# Patient Record
Sex: FEMALE | URBAN - NONMETROPOLITAN AREA
[De-identification: names, ages, dates, MRNs, and addresses within clinical notes are randomized per-mention and may not be internally consistent; named-entity substitution may affect disease eponyms.]

---

## 2019-07-12 ENCOUNTER — APPOINTMENT (RX ONLY)
Dept: URBAN - NONMETROPOLITAN AREA CLINIC 1 | Facility: CLINIC | Age: 59
Setting detail: DERMATOLOGY
End: 2019-07-12

## 2019-07-12 DIAGNOSIS — Z85.828 PERSONAL HISTORY OF OTHER MALIGNANT NEOPLASM OF SKIN: ICD-10-CM

## 2019-07-12 DIAGNOSIS — Z12.83 ENCOUNTER FOR SCREENING FOR MALIGNANT NEOPLASM OF SKIN: ICD-10-CM

## 2019-07-12 PROBLEM — D48.5 NEOPLASM OF UNCERTAIN BEHAVIOR OF SKIN: Status: ACTIVE | Noted: 2019-07-12

## 2019-07-12 PROCEDURE — 99202 OFFICE O/P NEW SF 15 MIN: CPT | Mod: 25

## 2019-07-12 PROCEDURE — 11102 TANGNTL BX SKIN SINGLE LES: CPT

## 2019-07-12 PROCEDURE — ? VITAMIN B3 COUNSELING

## 2019-07-12 PROCEDURE — ? BIOPSY BY SHAVE METHOD

## 2019-07-12 PROCEDURE — ? COUNSELING

## 2019-07-12 ASSESSMENT — LOCATION DETAILED DESCRIPTION DERM
LOCATION DETAILED: RIGHT CENTRAL MALAR CHEEK
LOCATION DETAILED: RIGHT SUPERIOR MEDIAL UPPER BACK

## 2019-07-12 ASSESSMENT — LOCATION ZONE DERM
LOCATION ZONE: FACE
LOCATION ZONE: TRUNK

## 2019-07-12 ASSESSMENT — LOCATION SIMPLE DESCRIPTION DERM
LOCATION SIMPLE: RIGHT CHEEK
LOCATION SIMPLE: RIGHT UPPER BACK

## 2019-07-12 NOTE — PROCEDURE: BIOPSY BY SHAVE METHOD
Electrodesiccation And Curettage Text: The wound bed was treated with electrodesiccation and curettage after the biopsy was performed.
Was A Bandage Applied: Yes
Dressing: Band-Aid
Anesthesia Volume In Cc: 1
Type Of Destruction Used: Electrodesiccation and Curettage
Consent: Written consent was obtained and risks were reviewed including but not limited to scarring, infection, bleeding, scabbing, incomplete removal, nerve damage and allergy to anesthesia.
Render Path Notes In Note?: No
Silver Nitrate Text: The wound bed was treated with silver nitrate after the biopsy was performed.
Billing Type: Third-Party Bill
Hemostasis: Aluminum Chloride
Curettage Text: The wound bed was treated with curettage after the biopsy was done.
Detail Level: Simple
Biopsy Method: Dermablade
Anesthesia Type: 1% lidocaine with epinephrine and a 1:10 solution of 8.4% sodium bicarbonate
Post-Care Instructions: I reviewed with the patient in detail post-care instructions. Patient is to keep the biopsy site dry overnight, and then apply Polysporin twice daily until healed. Patient may apply hydrogen peroxide soaks to remove any crusting.
Depth Of Biopsy: dermis
Size Of Lesion In Cm: 0.7
Lab: [id number]
Electrodesiccation Text: The wound bed was treated with electrodesiccation after the biopsy was performed.
Biopsy Type: H and E
Lab Facility: [id number]
Cryotherapy Text: The wound bed was treated with cryotherapy after the biopsy was performed.
X Size Of Lesion In Cm: 0.4
Notification Instructions: Patient will be notified of biopsy results. However, patient instructed to call the office if not contacted within 2 weeks.
Wound Care: Vaseline
Additional Anesthesia Volume In Cc (Will Not Render If 0): 0

## 2020-01-03 ENCOUNTER — APPOINTMENT (RX ONLY)
Dept: URBAN - NONMETROPOLITAN AREA CLINIC 1 | Facility: CLINIC | Age: 60
Setting detail: DERMATOLOGY
End: 2020-01-03

## 2020-01-03 DIAGNOSIS — Z41.9 ENCOUNTER FOR PROCEDURE FOR PURPOSES OTHER THAN REMEDYING HEALTH STATE, UNSPECIFIED: ICD-10-CM

## 2020-01-03 PROCEDURE — ? MEDICAL CONSULTATION: PRODUCTS

## 2020-01-03 PROCEDURE — ? COSMETIC CONSULTATION - MICRO-NEEDLING

## 2020-01-03 PROCEDURE — ? COSMETIC CONSULTATION: CHEMICAL PEELS

## 2020-01-03 PROCEDURE — ? COSMETIC CONSULTATION: LASER RESURFACING

## 2020-01-24 ENCOUNTER — APPOINTMENT (RX ONLY)
Dept: URBAN - NONMETROPOLITAN AREA CLINIC 1 | Facility: CLINIC | Age: 60
Setting detail: DERMATOLOGY
End: 2020-01-24

## 2020-01-24 DIAGNOSIS — Z41.9 ENCOUNTER FOR PROCEDURE FOR PURPOSES OTHER THAN REMEDYING HEALTH STATE, UNSPECIFIED: ICD-10-CM

## 2020-01-24 PROCEDURE — ? ADDITIONAL NOTES

## 2020-01-24 PROCEDURE — ? CLEAR AND BRILLIANT LASER

## 2020-01-24 ASSESSMENT — LOCATION ZONE DERM
LOCATION ZONE: NECK
LOCATION ZONE: TRUNK
LOCATION ZONE: FACE

## 2020-01-24 ASSESSMENT — LOCATION SIMPLE DESCRIPTION DERM
LOCATION SIMPLE: INFERIOR FOREHEAD
LOCATION SIMPLE: CHEST
LOCATION SIMPLE: LEFT ANTERIOR NECK

## 2020-01-24 ASSESSMENT — LOCATION DETAILED DESCRIPTION DERM
LOCATION DETAILED: MIDDLE STERNUM
LOCATION DETAILED: LEFT INFERIOR ANTERIOR NECK
LOCATION DETAILED: INFERIOR MID FOREHEAD

## 2020-01-24 NOTE — PROCEDURE: CLEAR AND BRILLIANT LASER
Detail Level: Zone
Laser Type: Clear+Brilliant
Consent: Written consent reviewed by RN and signed by pt.  Risks reviewed including but not limited to redness, heat sensation, swelling, pigmentary changes, scabbing, crusting, blistering, and reactivation of cold sores. \\n\\nNo guarantees can be made and results vary from pt to pt.
Pre-Procedure Text: Pre treatment photos taken and topical numbing removed with a warm towel prior to procedure.\\n\\nAppropriate eye protection placed on pt and pt instructed to keep their eyes closed during the treatment.
Topical Anesthesia?: 23% lidocaine, 7% tetracaine
Length Of Topical Anesthesia Application (Optional): 20 minutes
Post-Procedure Care: 8 passes with C&B handpiece, additional pass over areas of concern and 2 passes, low level under eyes. Moisturizer and sun protection applied to treatment area.
Price (Use Numbers Only, No Special Characters Or $): 500
Energy Level: High
Post-Care Instructions: RN reviewed with the patient in detail post-care instructions.  Pt aware of post treatment pain, swelling, redness, and rough texture, which can last days.  Strict sun avoidance and protection emphasized.  If sun exposure is unavoidable, pt instructed to cover tx area with clothing. \\n\\Héctor pt concerns and questions addressed, pt verbalized understanding.

## 2020-01-24 NOTE — PROCEDURE: ADDITIONAL NOTES
Additional Notes: Clear and [place] Laser Patient Post Care Instructions\\n\\nThe Clear & [place] Treatment is a safe and effective laser resurfacing treatment for fine lines, wrinkles, and superficial pigment, such as melasma.  \\n\\Toñito with any cosmetic treatment, no guarantees can be made and results vary individually from patient to patient.  This information sheet outlines expectations pre and post treatment and provides a reference for your home routine after treatment.  \\n\\Yanet treatment done in the office can be improved or worsened by what you do at home both before and after.  Your provider requests you bring your home skincare products with you for an assessment; either during the consult or on the day of service.  If you do not currently use skin care, a post treatment kit with products safe to use post treatment can be purchased for a minimal cost.  \\n\\nThe most common cause of complications is failing to follow these recommendations.\\n\\nBefore your treatment\\n• Discontinue retinol, retinoid, vitamin A, glycolic acids, salicylic acids, or other exfoliating products, such as scrubs, 2 days before treatment. \\n• Avoid excessive sun exposure; sunburned skin cannot be treated. \\n• Please inform your provider if you experience cold sores.  \\n Laser treatments can stimulate cold sores but an oral, anti-viral medication can be prescribed for prevention of an outbreak.  \\n You may take the anti-viral 3 days before and 3 days post the treatment to suppress possible outbreaks. \\n• Bring your skincare products with you for assessment of safe application after treatment.  \\n\\nDuring your treatment\\n• The consent will be reviewed and any questions or concerns you have will be addressed prior to beginning the treatment. \\n• Photos for before and after comparison are taken. \\n• A topical numbing agent, usually Lidocaine and Tetracaine, is applied to the treatment area.  \\n The Clear & [place] treatment feels like prickly heat while performed with a warm sensation, similar to a sunburn, for a few minutes to a few hours post treatment.  \\n Once the treatment area has become numb, the topical anesthetic will be removed. \\n• Eye protection is not necessary for this treatment.  However, the provider may provide you with eye protection or request your eyes remain closed during the treatment. \\n• The treatment should be tolerable.  If you are experiencing significant discomfort, please inform the provider and adjustments can be made to ensure your comfort. \\n\\nAfter your treatment\\n• Immediately after the treatment, you will experience redness and a heat sensation similar to a sunburn.  \\n Most redness resolves during the first day after treatment, but a rolly “glow” can remain for several days. \\n You may apply makeup as soon as 3 hours post treatment. \\n You may use ice or cool compresses to calm the sensation. \\n• Swelling is possible post treatment but usually resolves within 24 hours.  \\n You may use ice packs or take oral anti-inflammatory medications to diminish these affects. \\n• Over the next few days, you will feel a dry, sandpaper-like texture to your skin.  However, the texture is usually not visible to others or appears as dry skin.  \\n DO NOT pick at the flaking, this can cause hyperpigmentation; apply moisturizer as frequently as necessary to calm this affect and it should resolve in a few days. \\n You may wear make-up if you desire and remember to protect from the sun especially while healing. \\n• Most patients will have an improved appearance in 3 to 5 days.  Treatments can be performed monthly. \\n• Sun protection and wind protection is absolutely necessary following your treatment and especially until the skin has fully healed.  \\n It is recommended you avoid the sun completely for the first 24 to 48 hours post treatment. \\n If sun exposure is unavoidable, it is important to be diligent in reapplication of a broad-spectrum sunscreen every 90 minutes. In addition, protecting the treatment area with a wide brimmed hat or clothing is highly recommended.  \\n Excessive sun exposure while the skin is still healing can cause hyperpigmentation or other complications. \\n\\nHome Care After Your Clear & [place] Treatment\\nInstructions for Home Care Until the Skin Has Fully Healed\\n• The first 24-48 hours post treatment AVOID:\\n Direct sun exposure; it is recommended to protect the treatment area with clothing and a wide brimmed hat in addition to topical application of broad-spectrum sunscreen. \\n Excessive heat exposure such at saunas and hot tubs. \\n Excessive, intense exercise that stimulates a lot of internal heat or perspiration.    \\n Smoking and excessive alcohol intake.\\n• Cleanse with a gentle .\\n Cleansing is important to prevent breakouts. \\n If you are acne prone an anti-microbial spray may be recommended to suppress an acne flare. \\n• Moisturize with a gentle moisturizer approved by your provider. \\n• AVOID corrective products such as: \\n Retinol/Retinoids\\n Alpha Hydroxy Acids (AHAs)\\n Beta Hydroxy Acids (BHAs)\\n Salicylic Acids\\n Glycolic Acids\\n Products labeled “Anti-aging,” “Skin Renewing,” or “Exfoliating,” are not recommended while you are healing. \\n If in doubt of a product’s safety, avoid using the product and stick to the approved products discussed with your provider. \\n• While some redness, flaking, crusting, and swelling is to be expected, if you feel you are having an abnormal reaction to the treatment you may contact the our office at [phone] at any time. \\n Some patients may experience a blistering of the skin after their treatment.  This is very rare but if you do experience a blister, DO NOT pick at the top of the blister. You may apply thick moisturizer such as Aquaphor or an anti-bacterial ointment to the blister and allow it to heal.  In most cases, the skin will return to normal once healed.\\n• PROTECT the treatment area from the sun with a broad-spectrum sunscreen\\n Remember to reapply every 90 minutes when you are exposed to the sun. \\nEspecially in the first 24-48 hours post treatment it is recommended to protect the treatment area with clothing or a wide-brimmed hat in addition to topical sunscreen application.  \\n• Once the skin is fully healed you may return to your normal skin care routine. \\nIf you have other questions or concerns, you may contact the office at anytime; [phone].  [facility] hopes you enjoy your experience and result!\\nOther Instructions:
Detail Level: Simple

## 2020-02-07 ENCOUNTER — APPOINTMENT (RX ONLY)
Dept: URBAN - NONMETROPOLITAN AREA CLINIC 1 | Facility: CLINIC | Age: 60
Setting detail: DERMATOLOGY
End: 2020-02-07

## 2020-02-07 DIAGNOSIS — Z41.9 ENCOUNTER FOR PROCEDURE FOR PURPOSES OTHER THAN REMEDYING HEALTH STATE, UNSPECIFIED: ICD-10-CM

## 2020-02-07 PROCEDURE — ? ADDITIONAL NOTES

## 2020-02-07 PROCEDURE — ? FRAXEL

## 2020-02-07 PROCEDURE — ? COSMETIC FOLLOW-UP

## 2020-02-07 NOTE — PROCEDURE: FRAXEL
Treatment Level: 6
Treatment Level: 1
Add Post-Care Below To The Note: No
Consent: Written consent reviewed by RN and signed by pt. Risks reviewed including but not limited to crusting, scabbing, swelling, erythema, heat,  pain, and incomplete improvement. \\n\\nPt aware that several treatments may be needed to achieve desired result.   \\n\\nNo guarantees can be made and results vary from patient to patient. \\n\\Héctor pt concerns and questions addressed, pt verbalized understanding.
Total Energy In Kj (Optional- Don't Include Units): 1.19
Price (Use Numbers Only, No Special Characters Or $): 200
Post-Care Instructions: RN reviewed with the pt in detail post-care instructions and provided instructions in writing to the pt. \\n\\nOutlined post treatment expectations and downtime.  Pt aware of post-treatment pain, swelling, and redness, which can last days.  Strict sun avoidance and protection emphasized.  If sun exposure is unavoidable, pt instructed to cover treatment area with clothing.  \\n\\nPt urged to purchase Lasercyn product to reduce risk of post treatment breakouts. \\n\\Héctor pt concerns and questions addressed, pt verbalized understanding.\\n\\nSpot tx of problem areas, perioral, fh, and under eyes.
Total Coverage: 17%
External Cooling Fan Speed: 5
Wavelength: 1550nm
Location: perioral area
Topical Anesthesia Type: 23% Lidocaine 7% Tetracaine
External Cooling: Viola Cryo 5
Length Of Topical Anesthesia Application (Optional): 15 minutes
Anesthesia Type: 1% lidocaine with epinephrine
Pre-Procedure Text (Will Appear After Anesthesia Text): Photos taken and topical numbing removed with a warm towel prior to procedure.\\n\\nAppropriate eye protection placed on pt and pt instructed to keep their eyes closed during the treatment.
Detail Level: Zone
Indication: resurfacing
Energy(Mj/Cm2): 60

## 2020-02-07 NOTE — PROCEDURE: ADDITIONAL NOTES
Additional Notes: Fraxel 1550 Laser Patient Care Instructions\\nThe Fraxel Laser Treatment is a safe and effective laser resurfacing treatment for deeper set lines and wrinkles, acne scarring, and some pigmented lesions.  Any cosmetic treatment can be improved or worsened by what you do at home both before and after.  \\n*The most common cause of complications is failing to follow these recommendations. *\\nBefore your treatment\\n• Discontinue topical retinol, retinoid, vitamin A, glycolic acids, salicylic acids, or other exfoliating products, such as scrubs, 2 days before treatment. \\n• Avoid excessive sun exposure; sunburned skin cannot be treated. \\n• Please inform your provider if you experience cold sores.  \\n     -Laser treatments can stimulate cold sores but an oral, anti-viral medication can be prescribed for prevention of an outbreak.  \\n     -You may take the anti-viral 3 days before and 3 days post the treatment to suppress possible outbreaks. \\n• If you are prone to acne or breakout please inform your provider. \\n     -An antimicrobial spray can be purchased for use after your treatment and greatly reduces the risk of post treatment breakouts. \\n• Bring your skincare products with you for assessment of safe application after treatment.\\n     -If you do not currently use skin care, a post treatment kit with products safe to use post treatment can be purchased for a minimal cost.  \\nDuring your treatment\\n• The consent will be reviewed and any questions or concerns you have will be addressed prior to beginning the treatment. \\n• Photos for before and after comparison are taken. \\n• A topical numbing agent, usually Lidocaine and Tetracaine, is applied to the treatment area.  \\n     -The Fraxel treatment feels like prickly heat while performed with a warm sensation, similar to a sunburn, for a few hours post treatment.  \\n     -Once the treatment area has become numb, the topical anesthetic and any makeup will be removed. \\n• Eye protection will be placed on your eyes.  Please keep your eyes closed during the treatment.   \\n• The treatment should be tolerable.  If you are experiencing significant discomfort, please inform the provider and adjustments can be made to ensure your comfort.\\n• Immediately after treatment a wound healing serum and sunscreen is applied to the treatment area.  \\nAfter your treatment\\n• Immediately after the treatment, you will experience heat sensation, redness, swelling and sometimes pinpoint bleeding. \\n     -Heat sensation can be intense and last for 2-3 hours after treatment. \\n     -Fanning, ice, and cool compresses can help reduce the hot sensation. \\no Swelling usually lasts two to three days.  The swelling can be significant and cause some discomfort. To minimize swelling do the following: \\n     -Apply cold compresses to the treatment area for 10 minutes of every hour to diminish heat sensation and swelling. \\n     -Sleep elevated the first night. Use as many pillows as you can tolerate.\\n     -You will notice most of the swelling on the first morning after treatment, particularly under the eyes. \\n• Over the next few days, redness may worsen. Some patients will also experience itching. \\no A topical 1% Hydrocortisone cream (over the counter) may help relieve redness and itching.  \\n• You may also notice that your skin appears bronzed or pigemented lesions look darker than before the treatment.\\no This is normal; the pigment will often flake off and show improvement over the next few days.\\n• Your skin may feel dry, peel, or flake 3-4 days after treatment. You may notice a “sandpaper” texture a few days after treatment.\\no This is the treated tissue working its way out of your body as new skin is regenerated. \\no DO NOT PICK at this flaking; allow it to slough off naturally. Applying moisturizer can help. \\n• Most patients complete the healing process 5- 7 days after a treatment on the face; off face treatment areas such as the neck or chest may take longer to heal. \\n• Once the initial sloughing is complete, you may notice some redness remains over the next few weeks.\\no Most redness resolves during the first week after treatment, but a rolly “glow” can remain for several weeks. If you wish, you can apply makeup to minimize the redness. \\nInstructions for Home Care Until the Skin Has Fully Healed\\n• The first 24-48 hours post treatment AVOID:\\no Direct sun exposure; it is recommended to protect the treatment area with clothing and a wide brimmed hat in addition to topical application of broad-spectrum sunscreen. \\no Excessive heat exposure such at saunas and hot tubs. \\no Excessive, intense exercise that stimulates a lot of internal heat or perspiration.    \\no Smoking and excessive alcohol intake.\\no Anything you would not do if you had a sunburn.\\n• Cleanse twice per day with a gentle .\\no Cleansing is important to prevent breakouts. \\no If you are acne prone, an anti-microbial spray may be used after cleansing to prevent breakout. \\n• Moisturize with a gentle moisturizer approved by your provider. \\n• AVOID topical corrective products such as: \\no Retinol/Retinoids\\no Alpha Hydroxy Acids (AHAs) or Beta Hydroxy Acids (BHAs)\\no Salicylic Acids or Glycolic Acids\\no Products labeled “Anti-aging,” “Skin Renewing,” or “Exfoliating,” are not recommended while you are healing. \\no If in doubt of a product’s safety, avoid using the product and stick to the approved products discussed with your provider. \\n• While some redness, flaking, crusting, and swelling is to be expected, if you feel you are having an abnormal reaction to the treatment you may contact the our office, [phone], at any time. \\no Some patients may experience a blistering of the skin after their treatment.  This is very rare but if you do experience a blister, DO NOT pick at the top of the blister. You may apply thick moisturizer such as Aquaphor or an anti-bacterial ointment to the blister and allow it to heal.  In most cases, the skin will return to normal once healed.\\no If you feel your reaction is abnormal, vinegar soaks are encouraged:\\n     -Vinegar Soak Recipe for Abnormal Healing:  \\n1. Dilute one teaspoon of white vinegar per cup of water \\n2. Leave mix in the refrigerator to cool \\n3. Soak gauze or a clean washcloth in the solution, then apply to the skin 2-3 times a day for 5 minutes followed by moisturizer. \\n• PROTECT the treatment area from the sun with a broad-spectrum sunscreen.\\no Remember to reapply every 90 minutes when you are exposed to the sun. \\no Especially in the first 24-48 hours post treatment it is recommended to protect the treatment area with clothing or a wide-brimmed hat in addition to topical sunscreen application.  \\n• Once the skin is fully healed you may return to your normal skin care routine. \\n• Your skin will continue to improve over the next 9 weeks
Detail Level: Simple

## 2020-02-07 NOTE — PROCEDURE: COSMETIC FOLLOW-UP
Global Improvement: Good
Treatment Override (Free Text): C&B
Comments (Free Text): After comparing before &after photos the pt and I agree she had a good improvement with her problem areas. I recommended we try Fraxel to spot tx the perioral and fh lines.
Detail Level: Zone
Price (Use Numbers Only, No Special Characters Or $): 0
Patient Satisfaction: Unsure

## 2020-03-13 ENCOUNTER — APPOINTMENT (RX ONLY)
Dept: URBAN - NONMETROPOLITAN AREA CLINIC 1 | Facility: CLINIC | Age: 60
Setting detail: DERMATOLOGY
End: 2020-03-13

## 2020-03-13 DIAGNOSIS — Z41.9 ENCOUNTER FOR PROCEDURE FOR PURPOSES OTHER THAN REMEDYING HEALTH STATE, UNSPECIFIED: ICD-10-CM

## 2020-03-13 PROCEDURE — ? FRAXEL

## 2020-03-13 PROCEDURE — ? ADDITIONAL NOTES

## 2020-03-13 NOTE — PROCEDURE: ADDITIONAL NOTES
Additional Notes: Fraxel 1550 Laser Patient Care Instructions\\nThe Fraxel Laser Treatment is a safe and effective laser resurfacing treatment for deeper set lines and wrinkles, acne scarring, and some pigmented lesions.  Any cosmetic treatment can be improved or worsened by what you do at home both before and after.  \\n*The most common cause of complications is failing to follow these recommendations. *\\nBefore your treatment\\n• Discontinue topical retinol, retinoid, vitamin A, glycolic acids, salicylic acids, or other exfoliating products, such as scrubs, 2 days before treatment. \\n• Avoid excessive sun exposure; sunburned skin cannot be treated. \\n• Please inform your provider if you experience cold sores.  \\n     -Laser treatments can stimulate cold sores but an oral, anti-viral medication can be prescribed for prevention of an outbreak.  \\n     -You may take the anti-viral 3 days before and 3 days post the treatment to suppress possible outbreaks. \\n• If you are prone to acne or breakout please inform your provider. \\n     -An antimicrobial spray can be purchased for use after your treatment and greatly reduces the risk of post treatment breakouts. \\n• Bring your skincare products with you for assessment of safe application after treatment.\\n     -If you do not currently use skin care, a post treatment kit with products safe to use post treatment can be purchased for a minimal cost.  \\nDuring your treatment\\n• The consent will be reviewed and any questions or concerns you have will be addressed prior to beginning the treatment. \\n• Photos for before and after comparison are taken. \\n• A topical numbing agent, usually Lidocaine and Tetracaine, is applied to the treatment area.  \\n     -The Fraxel treatment feels like prickly heat while performed with a warm sensation, similar to a sunburn, for a few hours post treatment.  \\n     -Once the treatment area has become numb, the topical anesthetic and any makeup will be removed. \\n• Eye protection will be placed on your eyes.  Please keep your eyes closed during the treatment.   \\n• The treatment should be tolerable.  If you are experiencing significant discomfort, please inform the provider and adjustments can be made to ensure your comfort.\\n• Immediately after treatment a wound healing serum and sunscreen is applied to the treatment area.  \\nAfter your treatment\\n• Immediately after the treatment, you will experience heat sensation, redness, swelling and sometimes pinpoint bleeding. \\n     -Heat sensation can be intense and last for 2-3 hours after treatment. \\n     -Fanning, ice, and cool compresses can help reduce the hot sensation. \\no Swelling usually lasts two to three days.  The swelling can be significant and cause some discomfort. To minimize swelling do the following: \\n     -Apply cold compresses to the treatment area for 10 minutes of every hour to diminish heat sensation and swelling. \\n     -Sleep elevated the first night. Use as many pillows as you can tolerate.\\n     -You will notice most of the swelling on the first morning after treatment, particularly under the eyes. \\n• Over the next few days, redness may worsen. Some patients will also experience itching. \\no A topical 1% Hydrocortisone cream (over the counter) may help relieve redness and itching.  \\n• You may also notice that your skin appears bronzed or pigemented lesions look darker than before the treatment.\\no This is normal; the pigment will often flake off and show improvement over the next few days.\\n• Your skin may feel dry, peel, or flake 3-4 days after treatment. You may notice a “sandpaper” texture a few days after treatment.\\no This is the treated tissue working its way out of your body as new skin is regenerated. \\no DO NOT PICK at this flaking; allow it to slough off naturally. Applying moisturizer can help. \\n• Most patients complete the healing process 5- 7 days after a treatment on the face; off face treatment areas such as the neck or chest may take longer to heal. \\n• Once the initial sloughing is complete, you may notice some redness remains over the next few weeks.\\no Most redness resolves during the first week after treatment, but a rolly “glow” can remain for several weeks. If you wish, you can apply makeup to minimize the redness. \\nInstructions for Home Care Until the Skin Has Fully Healed\\n• The first 24-48 hours post treatment AVOID:\\no Direct sun exposure; it is recommended to protect the treatment area with clothing and a wide brimmed hat in addition to topical application of broad-spectrum sunscreen. \\no Excessive heat exposure such at saunas and hot tubs. \\no Excessive, intense exercise that stimulates a lot of internal heat or perspiration.    \\no Smoking and excessive alcohol intake.\\no Anything you would not do if you had a sunburn.\\n• Cleanse twice per day with a gentle .\\no Cleansing is important to prevent breakouts. \\no If you are acne prone, an anti-microbial spray may be used after cleansing to prevent breakout. \\n• Moisturize with a gentle moisturizer approved by your provider. \\n• AVOID topical corrective products such as: \\no Retinol/Retinoids\\no Alpha Hydroxy Acids (AHAs) or Beta Hydroxy Acids (BHAs)\\no Salicylic Acids or Glycolic Acids\\no Products labeled “Anti-aging,” “Skin Renewing,” or “Exfoliating,” are not recommended while you are healing. \\no If in doubt of a product’s safety, avoid using the product and stick to the approved products discussed with your provider. \\n• While some redness, flaking, crusting, and swelling is to be expected, if you feel you are having an abnormal reaction to the treatment you may contact the our office, [phone], at any time. \\no Some patients may experience a blistering of the skin after their treatment.  This is very rare but if you do experience a blister, DO NOT pick at the top of the blister. You may apply thick moisturizer such as Aquaphor or an anti-bacterial ointment to the blister and allow it to heal.  In most cases, the skin will return to normal once healed.\\no If you feel your reaction is abnormal, vinegar soaks are encouraged:\\n     -Vinegar Soak Recipe for Abnormal Healing:  \\n1. Dilute one teaspoon of white vinegar per cup of water \\n2. Leave mix in the refrigerator to cool \\n3. Soak gauze or a clean washcloth in the solution, then apply to the skin 2-3 times a day for 5 minutes followed by moisturizer. \\n• PROTECT the treatment area from the sun with a broad-spectrum sunscreen.\\no Remember to reapply every 90 minutes when you are exposed to the sun. \\no Especially in the first 24-48 hours post treatment it is recommended to protect the treatment area with clothing or a wide-brimmed hat in addition to topical sunscreen application.  \\n• Once the skin is fully healed you may return to your normal skin care routine. \\n• Your skin will continue to improve over the next 9 weeks
Detail Level: Simple

## 2020-03-13 NOTE — PROCEDURE: FRAXEL
Energy(Mj/Cm2): 1
Number Of Passes: 6
Anesthesia Type: 1% lidocaine with epinephrine
Total Coverage: 17%
External Cooling: Viola Cryo 5
Location: perioral area
Post-Care Instructions: RN reviewed with the pt in detail post-care instructions and provided instructions in writing to the pt. \\n\\nOutlined post treatment expectations and downtime.  Pt aware of post-treatment pain, swelling, and redness, which can last days.  Strict sun avoidance and protection emphasized.  If sun exposure is unavoidable, pt instructed to cover treatment area with clothing.  \\n\\nPt urged to purchase Lasercyn product to reduce risk of post treatment breakouts. \\n\\Héctor pt concerns and questions addressed, pt verbalized understanding.\\n\\nSpot tx of problem areas, perioral, fh, and under eyes.
Indication: resurfacing
Consent: Written consent reviewed by RN and signed by pt. Risks reviewed including but not limited to crusting, scabbing, swelling, erythema, heat,  pain, and incomplete improvement. \\n\\nPt aware that several treatments may be needed to achieve desired result.   \\n\\nNo guarantees can be made and results vary from patient to patient. \\n\\Héctor pt concerns and questions addressed, pt verbalized understanding.
Pre-Procedure Text (Will Appear After Anesthesia Text): Photos taken and topical numbing removed with a warm towel prior to procedure.\\n\\nAppropriate eye protection placed on pt and pt instructed to keep their eyes closed during the treatment.\\n\\nFocused tx in perioccular and perioral areas.
Wavelength: 1550nm
Price (Use Numbers Only, No Special Characters Or $): 200
Total Energy In Kj (Optional- Don't Include Units): 0.87
External Cooling Fan Speed: 5
Treatment Number: 2
Length Of Topical Anesthesia Application (Optional): 15 minutes
Detail Level: Zone
Add Post-Care Below To The Note: No
Topical Anesthesia Type: 23% Lidocaine 7% Tetracaine
Energy(Mj/Cm2): 60

## 2021-09-02 ENCOUNTER — APPOINTMENT (RX ONLY)
Dept: URBAN - NONMETROPOLITAN AREA CLINIC 1 | Facility: CLINIC | Age: 61
Setting detail: DERMATOLOGY
End: 2021-09-02

## 2021-09-02 DIAGNOSIS — L57.0 ACTINIC KERATOSIS: ICD-10-CM

## 2021-09-02 DIAGNOSIS — Z12.83 ENCOUNTER FOR SCREENING FOR MALIGNANT NEOPLASM OF SKIN: ICD-10-CM

## 2021-09-02 DIAGNOSIS — I78.8 OTHER DISEASES OF CAPILLARIES: ICD-10-CM

## 2021-09-02 DIAGNOSIS — D22 MELANOCYTIC NEVI: ICD-10-CM

## 2021-09-02 DIAGNOSIS — L82.0 INFLAMED SEBORRHEIC KERATOSIS: ICD-10-CM

## 2021-09-02 PROBLEM — D22.5 MELANOCYTIC NEVI OF TRUNK: Status: ACTIVE | Noted: 2021-09-02

## 2021-09-02 PROCEDURE — 17003 DESTRUCT PREMALG LES 2-14: CPT | Mod: 59

## 2021-09-02 PROCEDURE — ? PRESCRIPTION

## 2021-09-02 PROCEDURE — ? LIQUID NITROGEN

## 2021-09-02 PROCEDURE — 99213 OFFICE O/P EST LOW 20 MIN: CPT | Mod: 25

## 2021-09-02 PROCEDURE — 17110 DESTRUCTION B9 LES UP TO 14: CPT

## 2021-09-02 PROCEDURE — 17000 DESTRUCT PREMALG LESION: CPT | Mod: 59

## 2021-09-02 PROCEDURE — ? COUNSELING

## 2021-09-02 RX ORDER — TRETIONIN 0.25 MG/G
1 CREAM TOPICAL QHS
Qty: 20 | Refills: 3 | Status: ERX | COMMUNITY
Start: 2021-09-02

## 2021-09-02 RX ADMIN — TRETIONIN 1: 0.25 CREAM TOPICAL at 00:00

## 2021-09-02 ASSESSMENT — LOCATION DETAILED DESCRIPTION DERM
LOCATION DETAILED: LEFT INFERIOR LATERAL MALAR CHEEK
LOCATION DETAILED: LEFT RIB CAGE
LOCATION DETAILED: RIGHT MID-UPPER BACK
LOCATION DETAILED: LEFT INFERIOR LATERAL UPPER BACK
LOCATION DETAILED: RIGHT RIB CAGE
LOCATION DETAILED: LEFT INFERIOR FOREHEAD
LOCATION DETAILED: LEFT LATERAL UPPER BACK
LOCATION DETAILED: RIGHT INFERIOR LATERAL MIDBACK
LOCATION DETAILED: SUPERIOR THORACIC SPINE
LOCATION DETAILED: RIGHT SUPERIOR MEDIAL UPPER BACK
LOCATION DETAILED: RIGHT INFERIOR UPPER BACK
LOCATION DETAILED: LEFT INFERIOR UPPER BACK
LOCATION DETAILED: LEFT MEDIAL FOREHEAD
LOCATION DETAILED: LEFT SUPERIOR LATERAL MALAR CHEEK

## 2021-09-02 ASSESSMENT — LOCATION SIMPLE DESCRIPTION DERM
LOCATION SIMPLE: UPPER BACK
LOCATION SIMPLE: LEFT FOREHEAD
LOCATION SIMPLE: LEFT CHEEK
LOCATION SIMPLE: RIGHT UPPER BACK
LOCATION SIMPLE: RIGHT LOWER BACK
LOCATION SIMPLE: ABDOMEN
LOCATION SIMPLE: LEFT UPPER BACK

## 2021-09-02 ASSESSMENT — LOCATION ZONE DERM
LOCATION ZONE: FACE
LOCATION ZONE: TRUNK

## 2021-09-02 NOTE — PROCEDURE: LIQUID NITROGEN
Detail Level: Simple
Show Applicator Variable?: Yes
Render Note In Bullet Format When Appropriate: No
Post-Care Instructions: I reviewed with the patient in detail post-care instructions. Patient is to wear sunprotection, and avoid picking at any of the treated lesions. Pt may apply Vaseline to crusted or scabbing areas.
Number Of Freeze-Thaw Cycles: 2 freeze-thaw cycles
Duration Of Freeze Thaw-Cycle (Seconds): 0
Consent: The patient's consent was obtained including but not limited to risks of crusting, scabbing, blistering, scarring, darker or lighter pigmentary change, recurrence, incomplete removal and infection.
Medical Necessity Clause: This procedure was medically necessary because the lesions that were treated were:
Medical Necessity Information: It is in your best interest to select a reason for this procedure from the list below. All of these items fulfill various CMS LCD requirements except the new and changing color options.

## 2021-12-02 ENCOUNTER — APPOINTMENT (RX ONLY)
Dept: URBAN - NONMETROPOLITAN AREA CLINIC 1 | Facility: CLINIC | Age: 61
Setting detail: DERMATOLOGY
End: 2021-12-02

## 2021-12-02 DIAGNOSIS — L82.0 INFLAMED SEBORRHEIC KERATOSIS: ICD-10-CM

## 2021-12-02 PROCEDURE — 17111 DESTRUCTION B9 LESIONS 15/>: CPT

## 2021-12-02 PROCEDURE — ? LIQUID NITROGEN

## 2021-12-02 PROCEDURE — ? COUNSELING

## 2021-12-02 ASSESSMENT — LOCATION DETAILED DESCRIPTION DERM
LOCATION DETAILED: RIGHT SUPERIOR MEDIAL UPPER BACK
LOCATION DETAILED: LEFT MEDIAL UPPER BACK
LOCATION DETAILED: RIGHT RIB CAGE
LOCATION DETAILED: LEFT RIB CAGE
LOCATION DETAILED: LEFT LATERAL ABDOMEN
LOCATION DETAILED: LEFT INFERIOR UPPER BACK
LOCATION DETAILED: LEFT SUPERIOR MEDIAL LOWER BACK
LOCATION DETAILED: RIGHT LATERAL ABDOMEN

## 2021-12-02 ASSESSMENT — LOCATION SIMPLE DESCRIPTION DERM
LOCATION SIMPLE: RIGHT UPPER BACK
LOCATION SIMPLE: LEFT LOWER BACK
LOCATION SIMPLE: LEFT UPPER BACK
LOCATION SIMPLE: ABDOMEN

## 2021-12-02 ASSESSMENT — LOCATION ZONE DERM: LOCATION ZONE: TRUNK

## 2021-12-02 NOTE — PROCEDURE: LIQUID NITROGEN
Detail Level: Simple
Render Note In Bullet Format When Appropriate: No
Show Applicator Variable?: Yes
Post-Care Instructions: I reviewed with the patient in detail post-care instructions. Patient is to wear sunprotection, and avoid picking at any of the treated lesions. Pt may apply Vaseline to crusted or scabbing areas.
Number Of Freeze-Thaw Cycles: 2 freeze-thaw cycles
Medical Necessity Information: It is in your best interest to select a reason for this procedure from the list below. All of these items fulfill various CMS LCD requirements except the new and changing color options.
Medical Necessity Clause: This procedure was medically necessary because the lesions that were treated were:
Consent: The patient's consent was obtained including but not limited to risks of crusting, scabbing, blistering, scarring, darker or lighter pigmentary change, recurrence, incomplete removal and infection.

## 2022-02-11 RX ORDER — TRETIONIN 0.25 MG/G
CREAM TOPICAL QHS
Qty: 20 | Refills: 3 | Status: ERX

## 2022-06-27 ENCOUNTER — TELEPHONE (OUTPATIENT)
Dept: SCHEDULING | Facility: IMAGING CENTER | Age: 62
End: 2022-06-27

## 2022-06-28 ENCOUNTER — OFFICE VISIT (OUTPATIENT)
Dept: MEDICAL GROUP | Facility: LAB | Age: 62
End: 2022-06-28
Payer: COMMERCIAL

## 2022-06-28 VITALS
WEIGHT: 143.5 LBS | RESPIRATION RATE: 16 BRPM | DIASTOLIC BLOOD PRESSURE: 84 MMHG | SYSTOLIC BLOOD PRESSURE: 110 MMHG | HEIGHT: 64 IN | TEMPERATURE: 97.8 F | OXYGEN SATURATION: 96 % | HEART RATE: 66 BPM | BODY MASS INDEX: 24.5 KG/M2

## 2022-06-28 DIAGNOSIS — Z11.59 NEED FOR HEPATITIS C SCREENING TEST: ICD-10-CM

## 2022-06-28 DIAGNOSIS — Z76.89 ENCOUNTER TO ESTABLISH CARE: ICD-10-CM

## 2022-06-28 DIAGNOSIS — Z78.0 POSTMENOPAUSE: Primary | ICD-10-CM

## 2022-06-28 DIAGNOSIS — E78.49 OTHER HYPERLIPIDEMIA: ICD-10-CM

## 2022-06-28 DIAGNOSIS — E55.9 VITAMIN D DEFICIENCY: ICD-10-CM

## 2022-06-28 DIAGNOSIS — Z12.31 SCREENING MAMMOGRAM FOR BREAST CANCER: ICD-10-CM

## 2022-06-28 PROBLEM — E03.8 SUBCLINICAL HYPOTHYROIDISM: Status: ACTIVE | Noted: 2021-05-11

## 2022-06-28 PROBLEM — M10.9 GOUT: Status: ACTIVE | Noted: 2018-10-01

## 2022-06-28 PROCEDURE — 99204 OFFICE O/P NEW MOD 45 MIN: CPT | Performed by: PHYSICIAN ASSISTANT

## 2022-06-28 RX ORDER — LEVOTHYROXINE SODIUM 0.12 MG/1
62.5 TABLET ORAL
COMMUNITY
Start: 2022-04-18 | End: 2022-10-14 | Stop reason: SDUPTHER

## 2022-06-28 RX ORDER — VALACYCLOVIR HYDROCHLORIDE 500 MG/1
500 TABLET, FILM COATED ORAL 2 TIMES DAILY
COMMUNITY

## 2022-06-28 NOTE — ASSESSMENT & PLAN NOTE
New to me; chronic and stable on current regimen.   Doing well on current regimen.   Requesting refills today.     Due for mammogram as well.

## 2022-06-28 NOTE — PROGRESS NOTES
"Elfego Ahuja is a 61 y.o. female new patient here to establish care wit new provider    HPI:      Postmenopause  New to me; chronic and stable on current regimen.   Doing well on current regimen.   Requesting refills today.     Due for mammogram as well.     Current medicines (including changes today)  Current Outpatient Medications   Medication Sig Dispense Refill   • levothyroxine (SYNTHROID) 125 MCG Tab Take 62.5 mcg by mouth.     • valACYclovir (VALTREX) 500 MG Tab Take 500 mg by mouth 2 times a day.     • Cetirizine HCl (ZYRTEC ALLERGY PO) Take  by mouth.     • Estradiol 10 MCG INSERT Insert twice a week 24 Each 3   • tretinoin (RETIN-A) 0.025 % cream        No current facility-administered medications for this visit.     She  has a past medical history of Thyroid disease.  She  has a past surgical history that includes hernia repair.  Social History     Tobacco Use   • Smoking status: Never Smoker   • Smokeless tobacco: Never Used   Vaping Use   • Vaping Use: Never used   Substance Use Topics   • Alcohol use: Yes     Social History     Social History Narrative   • Not on file     History reviewed. No pertinent family history.  No family status information on file.         ROS  Constitutional: Negative for fever, chills and malaise/fatigue.   HENT: Negative for congestion.    Eyes: Negative for pain.   Respiratory: Negative for cough and shortness of breath.    Cardiovascular: Negative for leg swelling.   Skin: Negative for rash.   Neurological: Negative for dizziness, focal weakness and headaches.   Endo/Heme/Allergies: Does not bruise/bleed easily.   Psychiatric/Behavioral: Negative for depression.  The patient is not nervous/anxious.    All other systems reviewed and are negative     Objective:     /84 (BP Location: Left arm, Patient Position: Sitting, BP Cuff Size: Adult)   Pulse 66   Temp 36.6 °C (97.8 °F) (Temporal)   Resp 16   Ht 1.626 m (5' 4\")   Wt 65.1 kg (143 lb 8 oz)   SpO2 96%  Body mass " index is 24.63 kg/m².  Physical Exam:    Constitutional: Alert, no distress.  Skin: Warm, dry, good turgor, no rashes in visible areas.  Eye: Equal, round and reactive, conjunctiva clear, lids normal.  Neck: Trachea midline, no masses, no thyromegaly. No cervical or supraclavicular lymphadenopathy.  Respiratory: Unlabored respiratory effort, lungs clear to auscultation, no wheezes, no ronchi.  Cardiovascular: Normal S1, S2, no murmur, no edema.  Psych: Alert and oriented x3, normal affect and mood.    Assessment and Plan:   The following treatment plan was discussed    1. Encounter to establish care  New patient to me today     2. Postmenopause  New to me; chronic and stable on current regimen.   Refills as written.   - Estradiol 10 MCG INSERT; Insert twice a week  Dispense: 24 Each; Refill: 3    3. Screening mammogram for breast cancer  Due for mammogram   - MA-SCREENING MAMMO BILAT W/TOMOSYNTHESIS W/CAD; Future    4. Other hyperlipidemia  - Lipid Profile; Future    5. Vitamin D deficiency  - VITAMIN D,25 HYDROXY; Future    6. Need for hepatitis C screening test  - HCV Scrn ( [id number] 1xLife); Future      Followup: Return in about 1 year (around 2023), or if symptoms worsen or fail to improve.       Mitsy Paredes P.A.-C.  Supervising MD: Dr. Marcellus Arguello MD  22

## 2022-06-30 ENCOUNTER — TELEPHONE (OUTPATIENT)
Dept: MEDICAL GROUP | Facility: LAB | Age: 62
End: 2022-06-30
Payer: COMMERCIAL

## 2022-06-30 NOTE — TELEPHONE ENCOUNTER
Pharmacy called requesting information about patient Estradiol, they would like the quantity per dose and how far apart the patient should be inserting the Estradiol. 2 days in a row, with a day or two in between. Let me know and I can give them a call to verify.

## 2022-07-05 ENCOUNTER — PATIENT MESSAGE (OUTPATIENT)
Dept: MEDICAL GROUP | Facility: LAB | Age: 62
End: 2022-07-05
Payer: COMMERCIAL

## 2022-07-05 DIAGNOSIS — Z78.0 POSTMENOPAUSE: ICD-10-CM

## 2022-07-20 ENCOUNTER — HOSPITAL ENCOUNTER (OUTPATIENT)
Dept: LAB | Facility: MEDICAL CENTER | Age: 62
End: 2022-07-20
Attending: PHYSICIAN ASSISTANT
Payer: COMMERCIAL

## 2022-07-20 DIAGNOSIS — E55.9 VITAMIN D DEFICIENCY: ICD-10-CM

## 2022-07-20 DIAGNOSIS — E78.49 OTHER HYPERLIPIDEMIA: ICD-10-CM

## 2022-07-20 DIAGNOSIS — Z11.59 NEED FOR HEPATITIS C SCREENING TEST: ICD-10-CM

## 2022-07-20 LAB
25(OH)D3 SERPL-MCNC: 32 NG/ML (ref 30–100)
CHOLEST SERPL-MCNC: 212 MG/DL (ref 100–199)
FASTING STATUS PATIENT QL REPORTED: NORMAL
HCV AB SER QL: NORMAL
HDLC SERPL-MCNC: 68 MG/DL
LDLC SERPL CALC-MCNC: 124 MG/DL
TRIGL SERPL-MCNC: 100 MG/DL (ref 0–149)

## 2022-07-20 PROCEDURE — 80061 LIPID PANEL: CPT

## 2022-07-20 PROCEDURE — 36415 COLL VENOUS BLD VENIPUNCTURE: CPT

## 2022-07-20 PROCEDURE — 82306 VITAMIN D 25 HYDROXY: CPT

## 2022-07-20 PROCEDURE — G0472 HEP C SCREEN HIGH RISK/OTHER: HCPCS

## 2022-09-13 ENCOUNTER — APPOINTMENT (RX ONLY)
Dept: URBAN - METROPOLITAN AREA CLINIC 35 | Facility: CLINIC | Age: 62
Setting detail: DERMATOLOGY
End: 2022-09-13

## 2022-09-13 DIAGNOSIS — L81.4 OTHER MELANIN HYPERPIGMENTATION: ICD-10-CM

## 2022-09-13 DIAGNOSIS — L57.8 OTHER SKIN CHANGES DUE TO CHRONIC EXPOSURE TO NONIONIZING RADIATION: ICD-10-CM

## 2022-09-13 DIAGNOSIS — Z85.828 PERSONAL HISTORY OF OTHER MALIGNANT NEOPLASM OF SKIN: ICD-10-CM

## 2022-09-13 DIAGNOSIS — Z71.89 OTHER SPECIFIED COUNSELING: ICD-10-CM

## 2022-09-13 DIAGNOSIS — D22 MELANOCYTIC NEVI: ICD-10-CM

## 2022-09-13 DIAGNOSIS — L82.1 OTHER SEBORRHEIC KERATOSIS: ICD-10-CM

## 2022-09-13 DIAGNOSIS — H02.72 MADAROSIS OF EYELID AND PERIOCULAR AREA: ICD-10-CM

## 2022-09-13 PROBLEM — D22.5 MELANOCYTIC NEVI OF TRUNK: Status: ACTIVE | Noted: 2022-09-13

## 2022-09-13 PROBLEM — H02.729 MADAROSIS OF UNSPECIFIED EYE, UNSPECIFIED EYELID AND PERIOCULAR AREA: Status: ACTIVE | Noted: 2022-09-13

## 2022-09-13 PROCEDURE — ? PRESCRIPTION

## 2022-09-13 PROCEDURE — ? COUNSELING

## 2022-09-13 PROCEDURE — ? TREATMENT REGIMEN

## 2022-09-13 PROCEDURE — ? LATISSE COUNSELING

## 2022-09-13 PROCEDURE — 99203 OFFICE O/P NEW LOW 30 MIN: CPT

## 2022-09-13 RX ORDER — TRETIONIN 0.5 MG/G
THIN LAYER CREAM TOPICAL QHS
Qty: 45 | Refills: 6 | Status: ERX | COMMUNITY
Start: 2022-09-13

## 2022-09-13 RX ORDER — BIMATOPROST 0.3 MG/ML
1 SOLUTION/ DROPS OPHTHALMIC QHS
Qty: 5 | Refills: 3 | Status: ERX | COMMUNITY
Start: 2022-09-13

## 2022-09-13 RX ADMIN — TRETIONIN THIN LAYER: 0.5 CREAM TOPICAL at 00:00

## 2022-09-13 RX ADMIN — BIMATOPROST 1: 0.3 SOLUTION/ DROPS OPHTHALMIC at 00:00

## 2022-09-13 ASSESSMENT — LOCATION DETAILED DESCRIPTION DERM
LOCATION DETAILED: RIGHT CENTRAL MALAR CHEEK
LOCATION DETAILED: LEFT CENTRAL MALAR CHEEK
LOCATION DETAILED: EPIGASTRIC SKIN
LOCATION DETAILED: RIGHT SUPERIOR MEDIAL UPPER BACK
LOCATION DETAILED: RIGHT MEDIAL UPPER BACK

## 2022-09-13 ASSESSMENT — LOCATION ZONE DERM
LOCATION ZONE: TRUNK
LOCATION ZONE: FACE

## 2022-09-13 ASSESSMENT — LOCATION SIMPLE DESCRIPTION DERM
LOCATION SIMPLE: RIGHT UPPER BACK
LOCATION SIMPLE: RIGHT CHEEK
LOCATION SIMPLE: ABDOMEN
LOCATION SIMPLE: LEFT CHEEK

## 2022-09-13 NOTE — PROCEDURE: LATISSE COUNSELING
Detail Level: Zone
Latisse Counseling: I reviewed the possible side-effects of Latisse including itching, eye irritation, discoloration and exacerbating glaucoma.  I also discussed application methods.  Please see consent form for more complete discussion of Latisse Counseling.

## 2022-10-14 ENCOUNTER — OFFICE VISIT (OUTPATIENT)
Dept: MEDICAL GROUP | Facility: LAB | Age: 62
End: 2022-10-14
Payer: COMMERCIAL

## 2022-10-14 VITALS
RESPIRATION RATE: 16 BRPM | HEART RATE: 50 BPM | HEIGHT: 64 IN | TEMPERATURE: 97.8 F | OXYGEN SATURATION: 97 % | SYSTOLIC BLOOD PRESSURE: 104 MMHG | DIASTOLIC BLOOD PRESSURE: 60 MMHG | BODY MASS INDEX: 24.41 KG/M2 | WEIGHT: 143 LBS

## 2022-10-14 DIAGNOSIS — H93.13 TINNITUS OF BOTH EARS: ICD-10-CM

## 2022-10-14 DIAGNOSIS — E78.49 OTHER HYPERLIPIDEMIA: ICD-10-CM

## 2022-10-14 DIAGNOSIS — M25.512 LEFT SHOULDER PAIN, UNSPECIFIED CHRONICITY: ICD-10-CM

## 2022-10-14 DIAGNOSIS — Z76.89 ENCOUNTER TO ESTABLISH CARE WITH NEW DOCTOR: ICD-10-CM

## 2022-10-14 DIAGNOSIS — Z23 FLU VACCINE NEED: ICD-10-CM

## 2022-10-14 DIAGNOSIS — Z12.31 ENCOUNTER FOR SCREENING MAMMOGRAM FOR MALIGNANT NEOPLASM OF BREAST: ICD-10-CM

## 2022-10-14 DIAGNOSIS — E03.8 SUBCLINICAL HYPOTHYROIDISM: ICD-10-CM

## 2022-10-14 PROCEDURE — 99214 OFFICE O/P EST MOD 30 MIN: CPT | Mod: 25 | Performed by: FAMILY MEDICINE

## 2022-10-14 PROCEDURE — 90471 IMMUNIZATION ADMIN: CPT | Performed by: FAMILY MEDICINE

## 2022-10-14 PROCEDURE — 90686 IIV4 VACC NO PRSV 0.5 ML IM: CPT | Performed by: FAMILY MEDICINE

## 2022-10-14 RX ORDER — CYCLOBENZAPRINE HCL 5 MG
5 TABLET ORAL 3 TIMES DAILY PRN
Qty: 15 TABLET | Refills: 0 | Status: SHIPPED | OUTPATIENT
Start: 2022-10-14 | End: 2023-12-01

## 2022-10-14 RX ORDER — LEVOTHYROXINE SODIUM 0.12 MG/1
62.5 TABLET ORAL
Qty: 90 TABLET | Refills: 3 | Status: SHIPPED | OUTPATIENT
Start: 2022-10-14 | End: 2023-09-28 | Stop reason: SDUPTHER

## 2022-10-14 RX ORDER — ESTRADIOL 10 UG/1
INSERT VAGINAL
COMMUNITY
Start: 2022-07-26 | End: 2022-10-14

## 2022-10-14 RX ORDER — METHYLPREDNISOLONE 4 MG/1
TABLET ORAL
Qty: 21 TABLET | Refills: 0 | Status: SHIPPED | OUTPATIENT
Start: 2022-10-14 | End: 2023-10-24

## 2022-10-14 RX ORDER — TRETINOIN 0.5 MG/G
CREAM TOPICAL
COMMUNITY
Start: 2022-09-18 | End: 2022-10-14

## 2022-10-14 NOTE — PROGRESS NOTES
CC: Here to establish care, reviewed concerns as per HPI    HPI: Established patient, new to me  Elfego presents today to establish care, 61 years old female with past medical history significant for hypothyroidism, hyperlipidemia, discussed the following today:    1. Encounter to establish care with new doctor    Reviewed past medical problems, records past surgical history, family/social history and medications today.  2. Encounter for screening mammogram for malignant neoplasm of breast  Due for mammogram, will place order today no concerns    3. Subclinical hypothyroidism    Requesting to refill her thyroid medication, no concerns  4. Other hyperlipidemia  Chronic, patient is not on statins.  LDL in July 124.  Did not have any CT calcium score in the past.    5. Flu vaccine need  Denies acute illness or fever  6. Tinnitus of both ears  Chronic, ongoing problem.  With associated with possible some hearing deficiency    7. Left shoulder pain, unspecified chronicity  Chronic for the patient, new to me ongoing  Started 3 months ago,  Patient lifts weight and exercises.  Has been having this pain for the past 3 months with some changes in the range of motion and mobility of the left upper extremity because of the shoulder pain and arm pain.    Patient Active Problem List    Diagnosis Date Noted    Encounter to establish care with new doctor 10/14/2022    Abnormal mammogram 05/20/2013    Postmenopause 07/01/2021    Subclinical hypothyroidism 05/11/2021    Other hyperlipidemia 05/11/2021    Gout 10/01/2018    Vitamin D deficiency 08/12/2015    Acquired absence of uterus with remaining cervical stump 07/09/2013    Tinnitus 05/10/2013    Genital herpes simplex 04/18/2013       Current Outpatient Medications   Medication Sig Dispense Refill    levothyroxine (SYNTHROID) 125 MCG Tab Take 0.5 Tablets by mouth every morning on an empty stomach. 90 Tablet 3    methylPREDNISolone (MEDROL DOSEPAK) 4 MG Tablet Therapy Pack As  directed on the packaging label. 21 Tablet 0    cyclobenzaprine (FLEXERIL) 5 mg tablet Take 1 Tablet by mouth 3 times a day as needed for Muscle Spasms or Moderate Pain. 15 Tablet 0    Estradiol 10 MCG INSERT Insert twice a week 24 Each 3    tretinoin (RETIN-A) 0.025 % cream       valACYclovir (VALTREX) 500 MG Tab Take 500 mg by mouth 2 times a day.      Cetirizine HCl (ZYRTEC ALLERGY PO) Take  by mouth.       No current facility-administered medications for this visit.         Allergies as of 10/14/2022    (No Known Allergies)        ROS: Denies any chest pain, Shortness of breath, Changes bowel or bladder, Lower extremity edema.    Physical Exam:  Gen.: Well-developed, well-nourished, no apparent distress,pleasant and cooperative with the examination  Skin:  Warm and dry with good turgor. No rashes or suspicious lesions in visible areas  Eye: PERRLA, conjunctiva and sclera clear, lids normal  HEENT: Normocephalic/atraumatic, sinuses nontender with palpation, TMs clear, nares patent with pink mucosa and clear rhinorrhea, lips without lesions, oropharynx clear.  Neck: Trachea midline,no masses or adenopathy  Thyroid: normal consistency and size. No masses or nodules. Not tender with palpation.  Cor: Regular rate and rhythm without murmur, gallop or rub.  Lungs: Respirations unlabored.Clear to auscultation with equal breath sounds bilaterally. No wheezes, rhonchi.  Abdomen: Soft nontender without hepatosplenomegaly or masses appreciated, normoactive bowel sounds. No hernias.  Extremities: No cyanosis, clubbing or edema, Symmetrical without deformities or malformations. Pulses 2+ and symmetrical both upper and lower extremities  Lymphatic: No abnormal adenopathy of the neck groin or axillae.  Psych: Alert and oriented x 3.Normal affect, judgement,insight and memory.    Left upper extremity and shoulder exam, there is definitely limitation of the range of motion, because of pain, there is firmness on the deltoid muscle  compared to the right side.    Assessment and Plan.   61 y.o. female to establish care    1. Encounter to establish care with new doctor  Reviewed medical history as above    2. Encounter for screening mammogram for malignant neoplasm of breast    - MA-SCREENING MAMMO BILAT W/O CAD; Future    3. Subclinical hypothyroidism  Chronic, controlled continue current dose as directed refilled medication today recheck thyroid function  - levothyroxine (SYNTHROID) 125 MCG Tab; Take 0.5 Tablets by mouth every morning on an empty stomach.  Dispense: 90 Tablet; Refill: 3  - TSH WITH REFLEX TO FT4; Future  - Comp Metabolic Panel; Future    4. Other hyperlipidemia  Chronic, assess cardiovascular risk ordered CT calcium score today  - CT-HEART W/O CONT EVAL CALCIUM; Future  - Comp Metabolic Panel; Future    5. Flu vaccine need    - INFLUENZA VACCINE QUAD INJ (PF)    6. Tinnitus of both ears  Chronic, ongoing follow-up for hearing test  - Referral to Audiology    7. Left shoulder pain, unspecified chronicity  Chronic ongoing, new to me.  Possibly adhesive capsulitis.  Will treat with Medrol Dosepak and muscle relaxant, patient to come back for further assessment will consider x-ray and physical therapy.  If not resolved  - methylPREDNISolone (MEDROL DOSEPAK) 4 MG Tablet Therapy Pack; As directed on the packaging label.  Dispense: 21 Tablet; Refill: 0  - cyclobenzaprine (FLEXERIL) 5 mg tablet; Take 1 Tablet by mouth 3 times a day as needed for Muscle Spasms or Moderate Pain.  Dispense: 15 Tablet; Refill: 0  - Sed Rate; Future    Please note that this dictation was created using voice recognition software. I have made every reasonable attempt to correct obvious errors but there may be errors of grammar and content that I may have overlooked prior to finalization of this note.

## 2022-10-17 ENCOUNTER — APPOINTMENT (OUTPATIENT)
Dept: LAB | Facility: MEDICAL CENTER | Age: 62
End: 2022-10-17
Payer: COMMERCIAL

## 2022-10-18 ENCOUNTER — APPOINTMENT (RX ONLY)
Dept: URBAN - METROPOLITAN AREA CLINIC 36 | Facility: CLINIC | Age: 62
Setting detail: DERMATOLOGY
End: 2022-10-18

## 2022-10-18 DIAGNOSIS — Z41.9 ENCOUNTER FOR PROCEDURE FOR PURPOSES OTHER THAN REMEDYING HEALTH STATE, UNSPECIFIED: ICD-10-CM

## 2022-10-18 PROCEDURE — ? COSMETIC CONSULTATION: FACIAL

## 2022-10-20 ENCOUNTER — APPOINTMENT (RX ONLY)
Dept: URBAN - METROPOLITAN AREA CLINIC 36 | Facility: CLINIC | Age: 62
Setting detail: DERMATOLOGY
End: 2022-10-20

## 2022-10-20 DIAGNOSIS — Z41.9 ENCOUNTER FOR PROCEDURE FOR PURPOSES OTHER THAN REMEDYING HEALTH STATE, UNSPECIFIED: ICD-10-CM

## 2022-10-20 PROCEDURE — ? SKINPEN

## 2022-10-20 ASSESSMENT — LOCATION SIMPLE DESCRIPTION DERM
LOCATION SIMPLE: CHIN
LOCATION SIMPLE: RIGHT SUPERIOR EYELID
LOCATION SIMPLE: NOSE
LOCATION SIMPLE: GLABELLA
LOCATION SIMPLE: LEFT EYEBROW
LOCATION SIMPLE: RIGHT CHEEK
LOCATION SIMPLE: RIGHT TEMPLE
LOCATION SIMPLE: UPPER LIP
LOCATION SIMPLE: RIGHT FOREHEAD
LOCATION SIMPLE: LEFT FOREHEAD
LOCATION SIMPLE: LEFT CHEEK

## 2022-10-20 ASSESSMENT — LOCATION DETAILED DESCRIPTION DERM
LOCATION DETAILED: LEFT CHIN
LOCATION DETAILED: LEFT INFERIOR CENTRAL MALAR CHEEK
LOCATION DETAILED: LEFT LATERAL FOREHEAD
LOCATION DETAILED: GLABELLA
LOCATION DETAILED: RIGHT SUPERIOR CENTRAL MALAR CHEEK
LOCATION DETAILED: LEFT LATERAL EYEBROW
LOCATION DETAILED: RIGHT MID TEMPLE
LOCATION DETAILED: RIGHT FOREHEAD
LOCATION DETAILED: LEFT SUPERIOR CENTRAL MALAR CHEEK
LOCATION DETAILED: PHILTRUM
LOCATION DETAILED: RIGHT LATERAL BUCCAL CHEEK
LOCATION DETAILED: NASAL TIP
LOCATION DETAILED: LEFT SUPERIOR LATERAL BUCCAL CHEEK
LOCATION DETAILED: LEFT CENTRAL MALAR CHEEK
LOCATION DETAILED: RIGHT INFERIOR CENTRAL MALAR CHEEK
LOCATION DETAILED: RIGHT MEDIAL MALAR CHEEK
LOCATION DETAILED: LEFT SUPERIOR LATERAL MALAR CHEEK
LOCATION DETAILED: RIGHT LATERAL SUPERIOR EYELID

## 2022-10-20 ASSESSMENT — LOCATION ZONE DERM
LOCATION ZONE: LIP
LOCATION ZONE: FACE
LOCATION ZONE: EYELID
LOCATION ZONE: NOSE

## 2022-10-20 NOTE — PROCEDURE: SKINPEN
Topical Anesthesia?: BLT cream (benzocaine 8%, lidocaine 5%, tetracaine 4%)
Treatment Number (Optional): 0
Location #4: n/a
Speed (Optional): 2 passes
Depth In Mm: 0.75
Depth In Mm: 1.5
Detail Level: Zone
Price (Use Numbers Only, No Special Characters Or $): 400
Post-Care Instructions: After the procedure, take precautions agains sun exposure. Do not apply sunscreen for 12 hours after the procedure. Do not apply make-up for 12 hours after the procedure. Avoid alcohol based toners for 10-14 days. After 2-3 days patients can return to their regular skin regimen.
Serum (Optional): Hyaluronic Hydrating Gel
Location #1: Forehead & laya orbitally & nose
Location #2: periorally, nasolabial, chin & jawline
Location #3: cheeks
Consent: Written consent obtained, risks reviewed including but not limited to pain, scarring, infection and incomplete improvement.  Patient understands the procedure is cosmetic in nature and will require out of pocket payment.

## 2022-10-31 ENCOUNTER — HOSPITAL ENCOUNTER (OUTPATIENT)
Dept: RADIOLOGY | Facility: MEDICAL CENTER | Age: 62
End: 2022-10-31
Attending: FAMILY MEDICINE
Payer: COMMERCIAL

## 2022-10-31 DIAGNOSIS — E78.49 OTHER HYPERLIPIDEMIA: ICD-10-CM

## 2022-10-31 PROCEDURE — 4410556 CT-CARDIAC SCORING (SELF PAY ONLY)

## 2022-11-04 ENCOUNTER — HOSPITAL ENCOUNTER (OUTPATIENT)
Dept: LAB | Facility: MEDICAL CENTER | Age: 62
End: 2022-11-04
Attending: FAMILY MEDICINE
Payer: COMMERCIAL

## 2022-11-04 DIAGNOSIS — E03.8 SUBCLINICAL HYPOTHYROIDISM: ICD-10-CM

## 2022-11-04 DIAGNOSIS — M25.512 LEFT SHOULDER PAIN, UNSPECIFIED CHRONICITY: ICD-10-CM

## 2022-11-04 DIAGNOSIS — E78.49 OTHER HYPERLIPIDEMIA: ICD-10-CM

## 2022-11-04 LAB
ALBUMIN SERPL BCP-MCNC: 4.4 G/DL (ref 3.2–4.9)
ALBUMIN/GLOB SERPL: 1.6 G/DL
ALP SERPL-CCNC: 73 U/L (ref 30–99)
ALT SERPL-CCNC: 16 U/L (ref 2–50)
ANION GAP SERPL CALC-SCNC: 12 MMOL/L (ref 7–16)
AST SERPL-CCNC: 25 U/L (ref 12–45)
BILIRUB SERPL-MCNC: 0.4 MG/DL (ref 0.1–1.5)
BUN SERPL-MCNC: 18 MG/DL (ref 8–22)
CALCIUM SERPL-MCNC: 9.5 MG/DL (ref 8.5–10.5)
CHLORIDE SERPL-SCNC: 105 MMOL/L (ref 96–112)
CO2 SERPL-SCNC: 24 MMOL/L (ref 20–33)
CREAT SERPL-MCNC: 0.98 MG/DL (ref 0.5–1.4)
ERYTHROCYTE [SEDIMENTATION RATE] IN BLOOD BY WESTERGREN METHOD: 8 MM/HOUR (ref 0–25)
GFR SERPLBLD CREATININE-BSD FMLA CKD-EPI: 65 ML/MIN/1.73 M 2
GLOBULIN SER CALC-MCNC: 2.8 G/DL (ref 1.9–3.5)
GLUCOSE SERPL-MCNC: 94 MG/DL (ref 65–99)
POTASSIUM SERPL-SCNC: 4.2 MMOL/L (ref 3.6–5.5)
PROT SERPL-MCNC: 7.2 G/DL (ref 6–8.2)
SODIUM SERPL-SCNC: 141 MMOL/L (ref 135–145)
TSH SERPL DL<=0.005 MIU/L-ACNC: 1.5 UIU/ML (ref 0.38–5.33)

## 2022-11-04 PROCEDURE — 36415 COLL VENOUS BLD VENIPUNCTURE: CPT

## 2022-11-04 PROCEDURE — 84443 ASSAY THYROID STIM HORMONE: CPT

## 2022-11-04 PROCEDURE — 85652 RBC SED RATE AUTOMATED: CPT

## 2022-11-04 PROCEDURE — 80053 COMPREHEN METABOLIC PANEL: CPT

## 2022-11-11 ENCOUNTER — OFFICE VISIT (OUTPATIENT)
Dept: MEDICAL GROUP | Facility: LAB | Age: 62
End: 2022-11-11
Payer: COMMERCIAL

## 2022-11-11 VITALS
TEMPERATURE: 97 F | RESPIRATION RATE: 16 BRPM | SYSTOLIC BLOOD PRESSURE: 120 MMHG | HEIGHT: 64 IN | BODY MASS INDEX: 24.59 KG/M2 | WEIGHT: 144 LBS | OXYGEN SATURATION: 96 % | HEART RATE: 58 BPM | DIASTOLIC BLOOD PRESSURE: 78 MMHG

## 2022-11-11 DIAGNOSIS — E03.9 HYPOTHYROIDISM, UNSPECIFIED TYPE: ICD-10-CM

## 2022-11-11 DIAGNOSIS — E03.8 OTHER SPECIFIED HYPOTHYROIDISM: ICD-10-CM

## 2022-11-11 DIAGNOSIS — E78.49 OTHER HYPERLIPIDEMIA: ICD-10-CM

## 2022-11-11 DIAGNOSIS — M25.512 LEFT SHOULDER PAIN, UNSPECIFIED CHRONICITY: ICD-10-CM

## 2022-11-11 PROCEDURE — 99214 OFFICE O/P EST MOD 30 MIN: CPT | Performed by: FAMILY MEDICINE

## 2022-11-11 RX ORDER — ESTRADIOL 10 UG/1
1 INSERT VAGINAL
COMMUNITY
Start: 2022-10-18 | End: 2023-02-23 | Stop reason: SDUPTHER

## 2022-11-11 NOTE — PROGRESS NOTES
Chief Complaint:   Chief Complaint   Patient presents with    Follow-Up    Shoulder Pain     left       HPI: Established patient  Elfego Ahuja is a 61 y.o. female who presents for follow-up and to review lab work results, discussed the following today:    1. Left shoulder pain, unspecified chronicity  Chronic and resolved after using Medrol Dosepak and muscle relaxant.  Discussed today referral to see orthopedics and MRI to rule out rotator cuff muscle injury.  Still having decrease in range of motion because of pain and inability to move the shoulder    2. Other hyperlipidemia  Reviewed lab work results lipid profile , HDL in the 60s.  CT calcium cardiac score at 0, low cardiac risk    3. Hypothyroidism, unspecified type    Thyroid function within normal limits, denies problems or concerns at this time.  Continues to take her medications daily, patient on 125 mcg of Synthroid daily.      Past medical history, family history, social history and medications reviewed and updated in the record.   Current medications, problem list and allergies reviewed in [facility]  [employer] maintenance topics are reviewed and updated.    Patient Active Problem List    Diagnosis Date Noted    Encounter to establish care with new doctor 10/14/2022    Abnormal mammogram 05/20/2013    Postmenopause 07/01/2021    Subclinical hypothyroidism 05/11/2021    Other hyperlipidemia 05/11/2021    Gout 10/01/2018    Vitamin D deficiency 08/12/2015    Acquired absence of uterus with remaining cervical stump 07/09/2013    Tinnitus 05/10/2013    Genital herpes simplex 04/18/2013     Family History   Problem Relation Age of Onset    Cancer Mother         breast ca    Cancer Father         skin cancer , merckle cell ca , cause f death     Social History     Socioeconomic History    Marital status:      Spouse name: Not on file    Number of children: Not on file    Years of education: Not on file    Highest education level: Not on file   Occupational  "History     Comment: marketing   Tobacco Use    Smoking status: Never    Smokeless tobacco: Never   Vaping Use    Vaping Use: Never used   Substance and Sexual Activity    Alcohol use: Yes    Drug use: Never    Sexual activity: Not on file     Comment:    Other Topics Concern    Not on file   Social History Narrative    Not on file     Social Determinants of Health     Financial Resource Strain: Not on file   Food Insecurity: Not on file   Transportation Needs: Not on file   Physical Activity: Not on file   Stress: Not on file   Social Connections: Not on file   Intimate Partner Violence: Not on file   Housing Stability: Not on file       Current Outpatient Medications   Medication Sig Dispense Refill    estradiol (VAGIFEM) 10 MCG Tab Insert 1 Tablet into the vagina every 3 days.      levothyroxine (SYNTHROID) 125 MCG Tab Take 0.5 Tablets by mouth every morning on an empty stomach. 90 Tablet 3    methylPREDNISolone (MEDROL DOSEPAK) 4 MG Tablet Therapy Pack As directed on the packaging label. 21 Tablet 0    cyclobenzaprine (FLEXERIL) 5 mg tablet Take 1 Tablet by mouth 3 times a day as needed for Muscle Spasms or Moderate Pain. 15 Tablet 0    Estradiol 10 MCG INSERT Insert twice a week 24 Each 3    tretinoin (RETIN-A) 0.025 % cream       valACYclovir (VALTREX) 500 MG Tab Take 500 mg by mouth 2 times a day.      Cetirizine HCl (ZYRTEC ALLERGY PO) Take  by mouth.       No current facility-administered medications for this visit.         Review Of Systems  As documented in HPI above  PHYSICAL EXAMINATION:    /78 (BP Location: Left arm, Patient Position: Sitting, BP Cuff Size: Adult)   Pulse (!) 58   Temp 36.1 °C (97 °F) (Temporal)   Resp 16   Ht 1.626 m (5' 4\")   Wt 65.3 kg (144 lb)   SpO2 96%   BMI 24.72 kg/m²   Gen.: Well-developed, well-nourished, no apparent distress, pleasant and cooperative with the examination  HEENT: Normocephalic/atraumatic,   Neck: No JVD or bruits, no adenopathy  Cor: " Regular rate and rhythm without murmur gallop or rub    Extremities: No cyanosis, clubbing or edema       ASSESSMENT/Plan:  1. Left shoulder pain, unspecified chronicity  Chronic, resolved.  Follow-up with orthopedics, MRI to rule out rotator cuff muscle injury, status post steroids and muscle relaxant use  Referral to Orthopedics    MR-SHOULDER-W/O LEFT      2. Other hyperlipidemia  Chronic, low cardiac risks reviewed CT calcium cardiac score with the patient today continue healthy lifestyle changes diet control weight management      3. Hypothyroidism, unspecified type  Chronic well-controlled continue current regimen         Please note that this dictation was created using voice recognition software. I have worked with consultants from the vendor as well as technical experts from [employer] to optimize the interface. I have made every reasonable attempt to correct obvious errors, but I expect that there are errors of grammar and possibly content that I did not discover before finalizing the note.

## 2022-11-16 ENCOUNTER — HOSPITAL ENCOUNTER (OUTPATIENT)
Dept: RADIOLOGY | Facility: MEDICAL CENTER | Age: 62
End: 2022-11-16
Payer: COMMERCIAL

## 2022-12-21 ENCOUNTER — APPOINTMENT (OUTPATIENT)
Dept: RADIOLOGY | Facility: MEDICAL CENTER | Age: 62
End: 2022-12-21
Attending: PHYSICIAN ASSISTANT
Payer: COMMERCIAL

## 2022-12-21 DIAGNOSIS — Z12.31 SCREENING MAMMOGRAM FOR BREAST CANCER: ICD-10-CM

## 2022-12-21 PROCEDURE — 77067 SCR MAMMO BI INCL CAD: CPT

## 2023-02-23 ENCOUNTER — OFFICE VISIT (OUTPATIENT)
Dept: MEDICAL GROUP | Facility: LAB | Age: 63
End: 2023-02-23
Payer: COMMERCIAL

## 2023-02-23 VITALS
HEART RATE: 58 BPM | HEIGHT: 64 IN | BODY MASS INDEX: 25.1 KG/M2 | SYSTOLIC BLOOD PRESSURE: 120 MMHG | DIASTOLIC BLOOD PRESSURE: 84 MMHG | TEMPERATURE: 97.3 F | OXYGEN SATURATION: 99 % | RESPIRATION RATE: 16 BRPM | WEIGHT: 147 LBS

## 2023-02-23 DIAGNOSIS — R51.9 NONINTRACTABLE HEADACHE, UNSPECIFIED CHRONICITY PATTERN, UNSPECIFIED HEADACHE TYPE: ICD-10-CM

## 2023-02-23 DIAGNOSIS — R51.9 TEMPORAL PAIN: ICD-10-CM

## 2023-02-23 DIAGNOSIS — R43.1 ABNORMAL SMELL: ICD-10-CM

## 2023-02-23 DIAGNOSIS — Z76.0 PRESCRIPTION REFILL: ICD-10-CM

## 2023-02-23 PROCEDURE — 99214 OFFICE O/P EST MOD 30 MIN: CPT | Performed by: FAMILY MEDICINE

## 2023-02-23 RX ORDER — ESTRADIOL 10 UG/1
10 INSERT VAGINAL
Qty: 8 TABLET | Refills: 3 | Status: SHIPPED | OUTPATIENT
Start: 2023-02-23 | End: 2023-03-18 | Stop reason: SDUPTHER

## 2023-02-23 RX ORDER — METHYLPREDNISOLONE 4 MG/1
TABLET ORAL
Qty: 21 TABLET | Refills: 0 | Status: SHIPPED | OUTPATIENT
Start: 2023-02-23 | End: 2023-10-24

## 2023-02-23 RX ORDER — VALACYCLOVIR HYDROCHLORIDE 1 G/1
1000 TABLET, FILM COATED ORAL 2 TIMES DAILY
Qty: 20 TABLET | Refills: 0 | Status: SHIPPED | OUTPATIENT
Start: 2023-02-23

## 2023-02-24 DIAGNOSIS — J32.9 SINUSITIS, UNSPECIFIED CHRONICITY, UNSPECIFIED LOCATION: ICD-10-CM

## 2023-02-24 RX ORDER — AMOXICILLIN AND CLAVULANATE POTASSIUM 875; 125 MG/1; MG/1
1 TABLET, FILM COATED ORAL 2 TIMES DAILY
Qty: 14 TABLET | Refills: 0 | Status: SHIPPED | OUTPATIENT
Start: 2023-02-24 | End: 2023-12-12

## 2023-02-24 NOTE — PROGRESS NOTES
Chief Complaint:   Chief Complaint   Patient presents with    Sinusitis     X3 weeks - headache     Medication Refill     estradiol       HPI: Established patient  Elfego Ahuja is a 62 y.o. female who presents for evaluation of the following today:    Temporal pain /Abnormal smell /headache    New concern.  Symptoms started around 1 week ago.  Patient reports having this pain discomfort and abnormal feeling in sensation that is very similar to when she had her shingles in the past.  The sensation is located on the left side of the temple area upper face and including the scalp area.    Associated with headache.  And also she noticed for the past few days that she has been having this abnormal smell, like she smells that if there is a sweet cookies or abnormal smell that is not in the room usually.  Patient reports no loss of consciousness, reports no other neurological signs, she said just the abnormal feeling on the left upper part of her head and pain in the temple area  Patient denies visual changes    Past medical history, family history, social history and medications reviewed and updated in the record. today  Current medications, problem list and allergies reviewed in [facility] today  Health maintenance topics are reviewed and updated.    Patient Active Problem List    Diagnosis Date Noted    Encounter to establish care with new doctor 10/14/2022    Abnormal mammogram 05/20/2013    Other specified hypothyroidism 11/11/2022    Postmenopause 07/01/2021    Subclinical hypothyroidism 05/11/2021    Other hyperlipidemia 05/11/2021    Gout 10/01/2018    Vitamin D deficiency 08/12/2015    Acquired absence of uterus with remaining cervical stump 07/09/2013    Tinnitus 05/10/2013    Genital herpes simplex 04/18/2013     Family History   Problem Relation Age of Onset    Cancer Mother         breast ca    Cancer Father         skin cancer , merckle cell ca , cause f death     Social History     Socioeconomic History    Marital  status:      Spouse name: Not on file    Number of children: Not on file    Years of education: Not on file    Highest education level: Not on file   Occupational History     Comment: marketing   Tobacco Use    Smoking status: Never    Smokeless tobacco: Never   Vaping Use    Vaping Use: Never used   Substance and Sexual Activity    Alcohol use: Yes    Drug use: Never    Sexual activity: Not on file     Comment:    Other Topics Concern    Not on file   Social History Narrative    Not on file     Social Determinants of Health     Financial Resource Strain: Not on file   Food Insecurity: Not on file   Transportation Needs: Not on file   Physical Activity: Not on file   Stress: Not on file   Social Connections: Not on file   Intimate Partner Violence: Not on file   Housing Stability: Not on file       Current Outpatient Medications   Medication Sig Dispense Refill    estradiol (VAGIFEM) 10 MCG Tab Insert 1 Tablet into the vagina every 3 days. 8 Tablet 3    methylPREDNISolone (MEDROL DOSEPAK) 4 MG Tablet Therapy Pack As directed on the packaging label. 21 Tablet 0    valacyclovir (VALTREX) 1 GM Tab Take 1 Tablet by mouth 2 times a day. 20 Tablet 0    levothyroxine (SYNTHROID) 125 MCG Tab Take 0.5 Tablets by mouth every morning on an empty stomach. 90 Tablet 3    methylPREDNISolone (MEDROL DOSEPAK) 4 MG Tablet Therapy Pack As directed on the packaging label. 21 Tablet 0    cyclobenzaprine (FLEXERIL) 5 mg tablet Take 1 Tablet by mouth 3 times a day as needed for Muscle Spasms or Moderate Pain. 15 Tablet 0    Estradiol 10 MCG INSERT Insert twice a week 24 Each 3    tretinoin (RETIN-A) 0.025 % cream       valACYclovir (VALTREX) 500 MG Tab Take 500 mg by mouth 2 times a day.      Cetirizine HCl (ZYRTEC ALLERGY PO) Take  by mouth.       No current facility-administered medications for this visit.         Review Of Systems  As documented in HPI above  PHYSICAL EXAMINATION:    /84 (BP Location: Left arm,  "Patient Position: Sitting, BP Cuff Size: Adult)   Pulse (!) 58   Temp 36.3 °C (97.3 °F) (Temporal)   Resp 16   Ht 1.626 m (5' 4\")   Wt 66.7 kg (147 lb)   SpO2 99%   BMI 25.23 kg/m²   Gen.: Well-developed, well-nourished, no apparent distress, pleasant and cooperative with the examination  HEENT: Normocephalic/atraumatic,   Neck: No JVD or bruits, no adenopathy  Cor: Regular rate and rhythm without murmur gallop or rub  Lungs: Clear to auscultation with equal breath sounds bilaterally. No wheezes, rhonchi.  Abdomen: Soft nontender without hepatosplenomegaly or masses appreciated, normoactive bowel sounds  Extremities: No cyanosis, clubbing or edema  Neurological exam: Awake alert, oriented X.3.  No focal neurological deficit, no sensory changes.  Cranial nerve exam within normal limits.\There is discomfort and tenderness as per patient exam on the area of concern in the left temple area and the left upper part of her face.  On palpation  ASSESSMENT/Plan:  1. Temporal pain  New concern, discussed with the patient differential diagnosis might include migraine headache, temporal arteritis, possibly shingles.  We will treat with steroids and antiviral, patient to do other full work-up for further evaluation of the headache and the new finding of abnormal smell, advised to do an MRI for further evaluation, if any red flag symptoms or worsening of symptoms patient to report to emergency room and she voices understanding, follow-up with me as directed.  methylPREDNISolone (MEDROL DOSEPAK) 4 MG Tablet Therapy Pack    valacyclovir (VALTREX) 1 GM Tab    MR-BRAIN-W/O      2. Abnormal smell  New concern as above, MRI of the brain ordered for further evaluation  methylPREDNISolone (MEDROL DOSEPAK) 4 MG Tablet Therapy Pack    valacyclovir (VALTREX) 1 GM Tab    MR-BRAIN-W/O      3. Nonintractable headache, unspecified chronicity pattern, unspecified headache type  As above  methylPREDNISolone (MEDROL DOSEPAK) 4 MG Tablet " Therapy Pack    valacyclovir (VALTREX) 1 GM Tab    MR-BRAIN-W/O      4. Prescription refill  estradiol (VAGIFEM) 10 MCG Tab         Please note that this dictation was created using voice recognition software. I have made every reasonable attempt to correct obvious errors but there may be errors of grammar and content that I may have overlooked prior to finalization of this note.

## 2023-03-18 DIAGNOSIS — Z76.0 PRESCRIPTION REFILL: ICD-10-CM

## 2023-03-20 RX ORDER — ESTRADIOL 10 UG/1
10 INSERT VAGINAL
Qty: 8 TABLET | Refills: 3 | Status: SHIPPED | OUTPATIENT
Start: 2023-03-20 | End: 2023-06-08

## 2023-04-13 ENCOUNTER — APPOINTMENT (RX ONLY)
Dept: URBAN - METROPOLITAN AREA CLINIC 36 | Facility: CLINIC | Age: 63
Setting detail: DERMATOLOGY
End: 2023-04-13

## 2023-04-13 DIAGNOSIS — Z41.9 ENCOUNTER FOR PROCEDURE FOR PURPOSES OTHER THAN REMEDYING HEALTH STATE, UNSPECIFIED: ICD-10-CM

## 2023-04-13 PROCEDURE — ? HYDRAFACIAL

## 2023-04-13 ASSESSMENT — LOCATION DETAILED DESCRIPTION DERM
LOCATION DETAILED: LEFT INFERIOR CENTRAL MALAR CHEEK
LOCATION DETAILED: RIGHT CENTRAL MALAR CHEEK
LOCATION DETAILED: LEFT CHIN
LOCATION DETAILED: RIGHT MEDIAL FOREHEAD

## 2023-04-13 ASSESSMENT — LOCATION SIMPLE DESCRIPTION DERM
LOCATION SIMPLE: RIGHT FOREHEAD
LOCATION SIMPLE: CHIN
LOCATION SIMPLE: RIGHT CHEEK
LOCATION SIMPLE: LEFT CHEEK

## 2023-04-13 ASSESSMENT — LOCATION ZONE DERM: LOCATION ZONE: FACE

## 2023-04-13 NOTE — PROCEDURE: HYDRAFACIAL
Indication: dehydrated skin
Number Of Passes Step 4: 1
Number Of Passes Step 1: 2
Consent: Written consent obtained, risks reviewed including but not limited to crusting, scabbing, blistering, scarring, darker or lighter pigmentary change, bruising, and/or incomplete response.
Vacuum Pressure High Setting (Will Not Render If Set To 0): 16
Solution: Beta-HD
Tip: Hydropeel Tip, Purple Aggression
Number Of Passes Step 6: 0
Comments: Going to [place], liked result from last skinpen.
Vacuum Pressure Low Setting (Will Not Render If Set To 0): 22
Post-Care Instructions: I reviewed with the patient in detail post-care instructions. Patient should stay away from the sun and wear sun protection until treated areas are fully healed.
Location: face
Procedure: Fusion
Tip: Hydropeel Tip, Clear
Tip: Hydropeel Tip, Teal
Solution Override
Procedure: Exfoliation
Procedure: Extraction
Procedure: Peel
Solution: Activ-4
Price (Use Numbers Only, No Special Characters Or $): 406
Procedure: Boost
Solution: Antiox-6
Solution: GlySal 15%

## 2023-05-09 DIAGNOSIS — U07.1 COVID-19: ICD-10-CM

## 2023-05-15 ENCOUNTER — APPOINTMENT (RX ONLY)
Dept: URBAN - METROPOLITAN AREA CLINIC 36 | Facility: CLINIC | Age: 63
Setting detail: DERMATOLOGY
End: 2023-05-15

## 2023-05-15 DIAGNOSIS — Z41.9 ENCOUNTER FOR PROCEDURE FOR PURPOSES OTHER THAN REMEDYING HEALTH STATE, UNSPECIFIED: ICD-10-CM

## 2023-05-15 PROCEDURE — ? COSMETIC CONSULTATION: FRACTIONAL RESURFACING

## 2023-05-15 ASSESSMENT — LOCATION DETAILED DESCRIPTION DERM
LOCATION DETAILED: RIGHT LOWER CUTANEOUS LIP
LOCATION DETAILED: GLABELLA
LOCATION DETAILED: PHILTRUM

## 2023-05-15 ASSESSMENT — LOCATION SIMPLE DESCRIPTION DERM
LOCATION SIMPLE: UPPER LIP
LOCATION SIMPLE: RIGHT LIP
LOCATION SIMPLE: GLABELLA

## 2023-05-15 ASSESSMENT — LOCATION ZONE DERM
LOCATION ZONE: LIP
LOCATION ZONE: FACE

## 2023-05-16 DIAGNOSIS — H10.9 CONJUNCTIVITIS, UNSPECIFIED CONJUNCTIVITIS TYPE, UNSPECIFIED LATERALITY: ICD-10-CM

## 2023-05-16 RX ORDER — BACITRACIN ZINC AND POLYMYXIN B SULFATE 500; 10000 [USP'U]/G; [USP'U]/G
0.5 OINTMENT OPHTHALMIC EVERY 12 HOURS
Qty: 3.5 G | Refills: 0 | Status: SHIPPED | OUTPATIENT
Start: 2023-05-16 | End: 2023-05-18 | Stop reason: SDUPTHER

## 2023-05-18 ENCOUNTER — PATIENT MESSAGE (OUTPATIENT)
Dept: MEDICAL GROUP | Facility: LAB | Age: 63
End: 2023-05-18
Payer: COMMERCIAL

## 2023-05-18 DIAGNOSIS — H10.9 CONJUNCTIVITIS, UNSPECIFIED CONJUNCTIVITIS TYPE, UNSPECIFIED LATERALITY: ICD-10-CM

## 2023-05-18 RX ORDER — BACITRACIN ZINC AND POLYMYXIN B SULFATE 500; 10000 [USP'U]/G; [USP'U]/G
0.5 OINTMENT OPHTHALMIC EVERY 12 HOURS
Qty: 3.5 G | Refills: 0 | Status: SHIPPED | OUTPATIENT
Start: 2023-05-18 | End: 2023-12-12

## 2023-05-22 ENCOUNTER — APPOINTMENT (RX ONLY)
Dept: URBAN - METROPOLITAN AREA CLINIC 36 | Facility: CLINIC | Age: 63
Setting detail: DERMATOLOGY
End: 2023-05-22

## 2023-05-22 DIAGNOSIS — Z41.9 ENCOUNTER FOR PROCEDURE FOR PURPOSES OTHER THAN REMEDYING HEALTH STATE, UNSPECIFIED: ICD-10-CM

## 2023-05-22 DIAGNOSIS — Z78.0 POSTMENOPAUSE: ICD-10-CM

## 2023-05-22 PROCEDURE — ? FRAXEL

## 2023-05-22 ASSESSMENT — LOCATION SIMPLE DESCRIPTION DERM
LOCATION SIMPLE: RIGHT CHEEK
LOCATION SIMPLE: UPPER LIP
LOCATION SIMPLE: RIGHT ANTERIOR NECK
LOCATION SIMPLE: RIGHT FOREHEAD
LOCATION SIMPLE: NOSE
LOCATION SIMPLE: RIGHT LIP
LOCATION SIMPLE: LEFT CHEEK

## 2023-05-22 ASSESSMENT — LOCATION DETAILED DESCRIPTION DERM
LOCATION DETAILED: RIGHT LOWER CUTANEOUS LIP
LOCATION DETAILED: RIGHT INFERIOR ANTERIOR NECK
LOCATION DETAILED: RIGHT CENTRAL MALAR CHEEK
LOCATION DETAILED: RIGHT INFERIOR MEDIAL FOREHEAD
LOCATION DETAILED: LEFT CENTRAL MALAR CHEEK
LOCATION DETAILED: NASAL ROOT
LOCATION DETAILED: PHILTRUM

## 2023-05-22 ASSESSMENT — LOCATION ZONE DERM
LOCATION ZONE: NECK
LOCATION ZONE: NOSE
LOCATION ZONE: FACE
LOCATION ZONE: LIP

## 2023-05-22 NOTE — TELEPHONE ENCOUNTER
Received request via: Pharmacy    Was the patient seen in the last year in this department? Yes  2/23/23  Does the patient have an active prescription (recently filled or refills available) for medication(s) requested? No    Does the patient have retirement Plus and need 100 day supply (blood pressure, diabetes and cholesterol meds only)? Medication is not for cholesterol, blood pressure or diabetes

## 2023-05-22 NOTE — PROCEDURE: FRAXEL
Price (Use Numbers Only, No Special Characters Or $): 073.00
Was An Eye Shield Used?: No
Detail Level: Simple
Number Of Passes: 1
Number Of Passes: 4
Total Coverage: 9%
Total Coverage: 11%
Post-Care Instructions: I reviewed with the patient in detail post-care instructions. Patient should avoid sun until area fully healed.
Energy(Mj/Cm2): 25
Tip: 15mm
Energy(Mj/Cm2): 70
Medium Plastic Eye Shield Text: The ocular mucosa was anesthetized with tetracaine. Once adequate anesthesia was optained, medium plastic eye shields were inserted and remained in place until the procedure was completed.
Treatment Level: 2
Wavelength: 1550nm
Indication: photodamage
Total Coverage: 20%
Small Metal Eye Shield Text: The ocular mucosa was anesthetized with tetracaine. Once adequate anesthesia was optained, small metal eye shields were inserted and remained in place until the procedure was completed.
Location: neck
Large Plastic Eye Shield Text: The ocular mucosa was anesthetized with tetracaine. Once adequate anesthesia was optained, large plastic eye shields were inserted and remained in place until the procedure was completed.
Energy(Mj/Cm2): 45
Medium Metal Eye Shield Text: The ocular mucosa was anesthetized with tetracaine. Once adequate anesthesia was optained, medium metal eye shields were inserted and remained in place until the procedure was completed.
Total Coverage: 7%
Energy(Mj/Cm2): 35
External Cooling: Randall Cryo 5
Large Metal Eye Shield Text: The ocular mucosa was anesthetized with tetracaine. Once adequate anesthesia was optained, large metal eye shields were inserted and remained in place until the procedure was completed.
Location: full face except eyelids
Topical Anesthesia Type: 23% lidocaine, 7% tetracaine
Location: perioral area
Energy(Mj/Cm2): 20
Treatment Level: 3
Depth In Microns (Use Numbers Only, No Special Characters Or $): 202
Small Plastic Eye Shield Text: The ocular mucosa was anesthetized with tetracaine. Once adequate anesthesia was optained, small plastic eye shields were inserted and remained in place until the procedure was completed.
Add Post-Care Below To The Note: Yes
Consent: Written consent obtained, risks reviewed including but not limited to pain and incomplete improvement.
External Cooling Fan Speed: 5

## 2023-05-24 ENCOUNTER — TELEPHONE (OUTPATIENT)
Dept: MEDICAL GROUP | Facility: LAB | Age: 63
End: 2023-05-24
Payer: COMMERCIAL

## 2023-05-24 NOTE — TELEPHONE ENCOUNTER
"Pharmacy called asking for clarification on \" Estradiol 10 MCG INSERT\" Quantity per dose   Call back # [phone]  "

## 2023-06-08 DIAGNOSIS — Z76.0 PRESCRIPTION REFILL: ICD-10-CM

## 2023-06-08 RX ORDER — ESTRADIOL 10 UG/1
INSERT VAGINAL
Qty: 24 TABLET | Refills: 4 | Status: SHIPPED | OUTPATIENT
Start: 2023-06-08 | End: 2023-06-13

## 2023-06-08 NOTE — TELEPHONE ENCOUNTER
Received request via: Pharmacy    Was the patient seen in the last year in this department? Yes  2/23/23  Does the patient have an active prescription (recently filled or refills available) for medication(s) requested? No    Does the patient have residential Plus and need 100 day supply (blood pressure, diabetes and cholesterol meds only)? Medication is not for cholesterol, blood pressure or diabetes

## 2023-06-10 DIAGNOSIS — Z76.0 PRESCRIPTION REFILL: ICD-10-CM

## 2023-06-12 NOTE — TELEPHONE ENCOUNTER
Received request via: Pharmacy    Was the patient seen in the last year in this department? Yes  LOV 02/23/2023  Does the patient have an active prescription (recently filled or refills available) for medication(s) requested? No    Does the patient have long term Plus and need 100 day supply (blood pressure, diabetes and cholesterol meds only)? Medication is not for cholesterol, blood pressure or diabetes and Patient does not have SCP

## 2023-06-13 RX ORDER — ESTRADIOL 10 UG/1
INSERT VAGINAL
Qty: 24 TABLET | Refills: 4 | Status: SHIPPED | OUTPATIENT
Start: 2023-06-13 | End: 2024-01-22 | Stop reason: SDUPTHER

## 2023-06-15 ENCOUNTER — APPOINTMENT (RX ONLY)
Dept: URBAN - METROPOLITAN AREA CLINIC 36 | Facility: CLINIC | Age: 63
Setting detail: DERMATOLOGY
End: 2023-06-15

## 2023-06-15 DIAGNOSIS — Z41.9 ENCOUNTER FOR PROCEDURE FOR PURPOSES OTHER THAN REMEDYING HEALTH STATE, UNSPECIFIED: ICD-10-CM

## 2023-06-15 PROCEDURE — ? HYDRAFACIAL

## 2023-06-15 ASSESSMENT — LOCATION ZONE DERM
LOCATION ZONE: NOSE
LOCATION ZONE: FACE

## 2023-06-15 ASSESSMENT — LOCATION DETAILED DESCRIPTION DERM
LOCATION DETAILED: NASAL SUPRATIP
LOCATION DETAILED: LEFT CENTRAL MALAR CHEEK
LOCATION DETAILED: RIGHT INFERIOR CENTRAL MALAR CHEEK
LOCATION DETAILED: RIGHT MEDIAL FOREHEAD
LOCATION DETAILED: LEFT CHIN

## 2023-06-15 ASSESSMENT — LOCATION SIMPLE DESCRIPTION DERM
LOCATION SIMPLE: RIGHT FOREHEAD
LOCATION SIMPLE: LEFT CHEEK
LOCATION SIMPLE: RIGHT CHEEK
LOCATION SIMPLE: NOSE
LOCATION SIMPLE: CHIN

## 2023-06-15 NOTE — PROCEDURE: HYDRAFACIAL
Vacuum Pressure High Setting (Will Not Render If Set To 0): 16
Treatment Number: 2
Number Of Passes Step 5: 1
Solution Override
Procedure: Fusion
Tip: Hydropeel Tip, Purple Aggression
Indication: anti-aging
Tip: Hydropeel Tip, Clear
Tip: Hydropeel Tip, Teal
Comments: Going to [place] & [place] for wedding
Solution: GlySal 15%
Procedure: Peel
Solution: Antiox-6
Procedure: Extraction
Number Of Passes Step 6: 0
Procedure: Exfoliation
Solution: Beta-HD
Location: face
Consent: Written consent obtained, risks reviewed including but not limited to crusting, scabbing, blistering, scarring, darker or lighter pigmentary change, bruising, and/or incomplete response.
Solution: Activ-4
Procedure: Boost
Post-Care Instructions: I reviewed with the patient in detail post-care instructions. Patient should stay away from the sun and wear sun protection until treated areas are fully healed.
Vacuum Pressure Low Setting (Will Not Render If Set To 0): 22
Price (Use Numbers Only, No Special Characters Or $): 158

## 2023-07-10 ENCOUNTER — APPOINTMENT (RX ONLY)
Dept: URBAN - METROPOLITAN AREA CLINIC 36 | Facility: CLINIC | Age: 63
Setting detail: DERMATOLOGY
End: 2023-07-10

## 2023-07-10 DIAGNOSIS — Z41.9 ENCOUNTER FOR PROCEDURE FOR PURPOSES OTHER THAN REMEDYING HEALTH STATE, UNSPECIFIED: ICD-10-CM

## 2023-07-10 PROCEDURE — ? FRAXEL

## 2023-07-10 ASSESSMENT — LOCATION ZONE DERM
LOCATION ZONE: NOSE
LOCATION ZONE: FACE
LOCATION ZONE: LIP
LOCATION ZONE: NECK

## 2023-07-10 ASSESSMENT — LOCATION DETAILED DESCRIPTION DERM
LOCATION DETAILED: RIGHT INFERIOR ANTERIOR NECK
LOCATION DETAILED: RIGHT INFERIOR MEDIAL FOREHEAD
LOCATION DETAILED: RIGHT LOWER CUTANEOUS LIP
LOCATION DETAILED: LEFT CENTRAL MALAR CHEEK
LOCATION DETAILED: RIGHT CENTRAL MALAR CHEEK
LOCATION DETAILED: NASAL ROOT
LOCATION DETAILED: PHILTRUM

## 2023-07-10 ASSESSMENT — LOCATION SIMPLE DESCRIPTION DERM
LOCATION SIMPLE: UPPER LIP
LOCATION SIMPLE: NOSE
LOCATION SIMPLE: RIGHT ANTERIOR NECK
LOCATION SIMPLE: RIGHT CHEEK
LOCATION SIMPLE: RIGHT FOREHEAD
LOCATION SIMPLE: LEFT CHEEK
LOCATION SIMPLE: RIGHT LIP

## 2023-07-10 NOTE — PROCEDURE: FRAXEL
Price (Use Numbers Only, No Special Characters Or $): 656
Was An Eye Shield Used?: No
Detail Level: Simple
Number Of Passes: 1
Number Of Passes: 4
Total Coverage: 40%
Total Coverage: 11%
Post-Care Instructions: I reviewed with the patient in detail post-care instructions. Patient should avoid sun until area fully healed.
Energy(Mj/Cm2): 25
Tip: 15mm
Energy(Mj/Cm2): 30
Medium Plastic Eye Shield Text: The ocular mucosa was anesthetized with tetracaine. Once adequate anesthesia was optained, medium plastic eye shields were inserted and remained in place until the procedure was completed.
Treatment Level: 2
Wavelength: 1550nm
Indication: photodamage
Total Coverage: 20%
Wavelength: 1927nm
Small Metal Eye Shield Text: The ocular mucosa was anesthetized with tetracaine. Once adequate anesthesia was optained, small metal eye shields were inserted and remained in place until the procedure was completed.
Location: perioral area
Large Plastic Eye Shield Text: The ocular mucosa was anesthetized with tetracaine. Once adequate anesthesia was optained, large plastic eye shields were inserted and remained in place until the procedure was completed.
Location: neck
Energy(Mj/Cm2): 20
Medium Metal Eye Shield Text: The ocular mucosa was anesthetized with tetracaine. Once adequate anesthesia was optained, medium metal eye shields were inserted and remained in place until the procedure was completed.
Total Coverage: 7%
Energy(Mj/Cm2): 60
External Cooling: Randall Cryo 5
Large Metal Eye Shield Text: The ocular mucosa was anesthetized with tetracaine. Once adequate anesthesia was optained, large metal eye shields were inserted and remained in place until the procedure was completed.
Location: full face except eyelids
Topical Anesthesia Type: 23% lidocaine, 7% tetracaine
Treatment Level: 5
Depth In Microns (Use Numbers Only, No Special Characters Or $): 202
Small Plastic Eye Shield Text: The ocular mucosa was anesthetized with tetracaine. Once adequate anesthesia was optained, small plastic eye shields were inserted and remained in place until the procedure was completed.
Add Post-Care Below To The Note: Yes
Consent: Written consent obtained, risks reviewed including but not limited to pain and incomplete improvement.

## 2023-08-24 RX ORDER — BIMATOPROST 0.3 MG/ML
1 SOLUTION/ DROPS OPHTHALMIC QHS
Qty: 5 | Refills: 3 | Status: ERX

## 2023-09-25 ENCOUNTER — APPOINTMENT (RX ONLY)
Dept: URBAN - METROPOLITAN AREA CLINIC 36 | Facility: CLINIC | Age: 63
Setting detail: DERMATOLOGY
End: 2023-09-25

## 2023-09-25 DIAGNOSIS — Z41.9 ENCOUNTER FOR PROCEDURE FOR PURPOSES OTHER THAN REMEDYING HEALTH STATE, UNSPECIFIED: ICD-10-CM

## 2023-09-25 PROCEDURE — ? FRAXEL

## 2023-09-25 ASSESSMENT — LOCATION DETAILED DESCRIPTION DERM
LOCATION DETAILED: RIGHT CENTRAL MALAR CHEEK
LOCATION DETAILED: LEFT CENTRAL MALAR CHEEK
LOCATION DETAILED: RIGHT INFERIOR ANTERIOR NECK
LOCATION DETAILED: RIGHT INFERIOR MEDIAL FOREHEAD
LOCATION DETAILED: RIGHT LOWER CUTANEOUS LIP
LOCATION DETAILED: NASAL ROOT
LOCATION DETAILED: PHILTRUM

## 2023-09-25 ASSESSMENT — LOCATION SIMPLE DESCRIPTION DERM
LOCATION SIMPLE: NOSE
LOCATION SIMPLE: RIGHT LIP
LOCATION SIMPLE: UPPER LIP
LOCATION SIMPLE: RIGHT CHEEK
LOCATION SIMPLE: RIGHT ANTERIOR NECK
LOCATION SIMPLE: RIGHT FOREHEAD
LOCATION SIMPLE: LEFT CHEEK

## 2023-09-25 ASSESSMENT — LOCATION ZONE DERM
LOCATION ZONE: NECK
LOCATION ZONE: LIP
LOCATION ZONE: NOSE
LOCATION ZONE: FACE

## 2023-09-25 NOTE — PROCEDURE: FRAXEL
Location: neck
Number Of Passes: 4
Energy(Mj/Cm2): 1
Treatment Level: 2
External Cooling Fan Speed: 5
Consent: Written consent obtained, risks reviewed including but not limited to pain and incomplete improvement.
Energy(Mj/Cm2): 20
Price (Use Numbers Only, No Special Characters Or $): 416.10
Total Coverage: 30%
Was An Eye Shield Used?: No
Add Post-Care Below To The Note: Yes
Detail Level: Simple
Total Coverage: 45%
Energy(Mj/Cm2): 70
Wavelength: 1927nm
Post-Care Instructions: I reviewed with the patient in detail post-care instructions. Patient should avoid sun until area fully healed.
Total Coverage: 20%
Small Metal Eye Shield Text: The ocular mucosa was anesthetized with tetracaine. Once adequate anesthesia was optained, small metal eye shields were inserted and remained in place until the procedure was completed.
Medium Metal Eye Shield Text: The ocular mucosa was anesthetized with tetracaine. Once adequate anesthesia was optained, medium metal eye shields were inserted and remained in place until the procedure was completed.
Small Plastic Eye Shield Text: The ocular mucosa was anesthetized with tetracaine. Once adequate anesthesia was optained, small plastic eye shields were inserted and remained in place until the procedure was completed.
Depth In Microns (Use Numbers Only, No Special Characters Or $): 202
Wavelength: 1550nm
Indication: acne scars
Total Coverage: 14%
Total Coverage: 40%
Tip: 15mm
Topical Anesthesia Type: 23% lidocaine, 7% tetracaine
Large Metal Eye Shield Text: The ocular mucosa was anesthetized with tetracaine. Once adequate anesthesia was optained, large metal eye shields were inserted and remained in place until the procedure was completed.
External Cooling: Randall Cryo 5
Location: full face except eyelids
Total Coverage: 11%
Treatment Number: 3
Medium Plastic Eye Shield Text: The ocular mucosa was anesthetized with tetracaine. Once adequate anesthesia was optained, medium plastic eye shields were inserted and remained in place until the procedure was completed.
Location: perioral area
Large Plastic Eye Shield Text: The ocular mucosa was anesthetized with tetracaine. Once adequate anesthesia was optained, large plastic eye shields were inserted and remained in place until the procedure was completed.

## 2023-09-28 DIAGNOSIS — E03.8 SUBCLINICAL HYPOTHYROIDISM: ICD-10-CM

## 2023-09-28 RX ORDER — LEVOTHYROXINE SODIUM 0.12 MG/1
62.5 TABLET ORAL
Qty: 90 TABLET | Refills: 3 | Status: SHIPPED | OUTPATIENT
Start: 2023-09-28

## 2023-09-28 NOTE — TELEPHONE ENCOUNTER
Received request via: Pharmacy    Was the patient seen in the last year in this department? Yes  LOV 02/23/2023  Does the patient have an active prescription (recently filled or refills available) for medication(s) requested? No    Does the patient have snf Plus and need 100 day supply (blood pressure, diabetes and cholesterol meds only)? Medication is not for cholesterol, blood pressure or diabetes and Patient does not have SCP

## 2023-10-24 ENCOUNTER — OFFICE VISIT (OUTPATIENT)
Dept: MEDICAL GROUP | Facility: LAB | Age: 63
End: 2023-10-24
Payer: COMMERCIAL

## 2023-10-24 VITALS
RESPIRATION RATE: 16 BRPM | WEIGHT: 147 LBS | HEART RATE: 72 BPM | HEIGHT: 64 IN | DIASTOLIC BLOOD PRESSURE: 70 MMHG | SYSTOLIC BLOOD PRESSURE: 110 MMHG | BODY MASS INDEX: 25.1 KG/M2 | OXYGEN SATURATION: 99 % | TEMPERATURE: 98.2 F

## 2023-10-24 DIAGNOSIS — R51.9 TEMPORAL PAIN: ICD-10-CM

## 2023-10-24 DIAGNOSIS — Z13.220 SCREENING FOR HYPERLIPIDEMIA: ICD-10-CM

## 2023-10-24 DIAGNOSIS — H53.8 VISION BLURRED: ICD-10-CM

## 2023-10-24 DIAGNOSIS — E03.8 SUBCLINICAL HYPOTHYROIDISM: ICD-10-CM

## 2023-10-24 DIAGNOSIS — R51.9 INTRACTABLE HEADACHE, UNSPECIFIED CHRONICITY PATTERN, UNSPECIFIED HEADACHE TYPE: ICD-10-CM

## 2023-10-24 DIAGNOSIS — Z23 FLU VACCINE NEED: ICD-10-CM

## 2023-10-24 PROCEDURE — 99214 OFFICE O/P EST MOD 30 MIN: CPT | Mod: 25 | Performed by: FAMILY MEDICINE

## 2023-10-24 PROCEDURE — 3074F SYST BP LT 130 MM HG: CPT | Performed by: FAMILY MEDICINE

## 2023-10-24 PROCEDURE — 90686 IIV4 VACC NO PRSV 0.5 ML IM: CPT | Performed by: FAMILY MEDICINE

## 2023-10-24 PROCEDURE — 3078F DIAST BP <80 MM HG: CPT | Performed by: FAMILY MEDICINE

## 2023-10-24 PROCEDURE — 90471 IMMUNIZATION ADMIN: CPT | Performed by: FAMILY MEDICINE

## 2023-10-24 RX ORDER — METHYLPREDNISOLONE 4 MG/1
TABLET ORAL
Qty: 21 TABLET | Refills: 0 | Status: SHIPPED | OUTPATIENT
Start: 2023-10-24 | End: 2023-12-12

## 2023-10-24 RX ORDER — SUMATRIPTAN 50 MG/1
50 TABLET, FILM COATED ORAL
Qty: 10 TABLET | Refills: 3 | Status: SHIPPED | OUTPATIENT
Start: 2023-10-24 | End: 2023-11-27

## 2023-10-24 NOTE — PROGRESS NOTES
Chief Complaint:   Chief Complaint   Patient presents with    Headache       HPI: Established patient  Elfego Ahuja is a 62 y.o. female who presents for follow-up, discussed the following today:        Temporal pain / headache,   Chronic ongoing problem, her headache started back in March, was evaluated, full work-up including MRI of the brain was ordered at that time, which came without significant findings within normal limits.  Patient said she has been getting the headache more frequently more than 4 times per month.  And it is specifically on the left side in the temporal region and she feels some sensitivity and pain on palpating the temporal region.  Reports also some blurring of vision and pain in the back of her left eye.  No nausea vomiting or fever or neck rigidity or pain.    Flu vaccine need    Denies acute illness or fever at this time.  Patient is okay with her flu vaccine today       hypothyroidism  Continues to take Synthroid, due to recheck labs        Past medical history, family history, social history and medications reviewed and updated in the record.  Today  Current medications, problem list and allergies reviewed in [facility] today  Health maintenance topics are reviewed and updated.    Patient Active Problem List    Diagnosis Date Noted    Encounter to establish care with new doctor 10/14/2022    Abnormal mammogram 05/20/2013    Other specified hypothyroidism 11/11/2022    Postmenopause 07/01/2021    Subclinical hypothyroidism 05/11/2021    Other hyperlipidemia 05/11/2021    Gout 10/01/2018    Vitamin D deficiency 08/12/2015    Acquired absence of uterus with remaining cervical stump 07/09/2013    Tinnitus 05/10/2013    Genital herpes simplex 04/18/2013     Family History   Problem Relation Age of Onset    Cancer Mother         breast ca    Cancer Father         skin cancer , merckle cell ca , cause f death     Social History     Socioeconomic History    Marital status:      Spouse name: Not  on file    Number of children: Not on file    Years of education: Not on file    Highest education level: Not on file   Occupational History     Comment: marketing   Tobacco Use    Smoking status: Never    Smokeless tobacco: Never   Vaping Use    Vaping Use: Never used   Substance and Sexual Activity    Alcohol use: Yes    Drug use: Never    Sexual activity: Not on file     Comment:    Other Topics Concern    Not on file   Social History Narrative    Not on file     Social Determinants of Health     Financial Resource Strain: Not on file   Food Insecurity: Not on file   Transportation Needs: Not on file   Physical Activity: Not on file   Stress: Not on file   Social Connections: Not on file   Intimate Partner Violence: Not on file   Housing Stability: Not on file       Current Outpatient Medications   Medication Sig Dispense Refill    SUMAtriptan (IMITREX) 50 MG Tab Take 1 Tablet by mouth one time as needed for Migraine for up to 1 dose. 10 Tablet 3    methylPREDNISolone (MEDROL DOSEPAK) 4 MG Tablet Therapy Pack As directed on the packaging label. 21 Tablet 0    amoxicillin-clavulanate (AUGMENTIN) 875-125 MG Tab Take 1 Tablet by mouth 2 times a day. 14 Tablet 0    levothyroxine (SYNTHROID) 125 MCG Tab Take 0.5 Tablets by mouth every morning on an empty stomach. 90 Tablet 3    estradiol (VAGIFEM) 10 MCG Tab PLACE 1 INSERT VAGINALLY EVERY  THIRD DAY 24 Tablet 4    Estradiol 10 MCG INSERT Insert twice a week 24 Each 3    bacitracin-polymyxin b (POLYSPORIN) 500-05529 UNIT/GM Ointment Apply 0.5 Inches to affected eye(s) every 12 hours. 3.5 g 0    Nirmatrelvir&Ritonavir 300/100 20 x 150 MG & 10 x 100MG Tablet Therapy Pack Take 300 mg nirmatrelvir (two 150 mg tablets) with 100 mg ritonavir (one 100 mg tablet) by mouth, with all three tablets taken together twice daily for 5 days. 30 Each 0    valacyclovir (VALTREX) 1 GM Tab Take 1 Tablet by mouth 2 times a day. 20 Tablet 0    cyclobenzaprine (FLEXERIL) 5 mg tablet  "Take 1 Tablet by mouth 3 times a day as needed for Muscle Spasms or Moderate Pain. 15 Tablet 0    tretinoin (RETIN-A) 0.025 % cream       valACYclovir (VALTREX) 500 MG Tab Take 500 mg by mouth 2 times a day.      Cetirizine HCl (ZYRTEC ALLERGY PO) Take  by mouth.       No current facility-administered medications for this visit.         Review Of Systems  As documented in HPI above  PHYSICAL EXAMINATION:    /70 (BP Location: Right arm, Patient Position: Sitting, BP Cuff Size: Adult)   Pulse 72   Temp 36.8 °C (98.2 °F) (Temporal)   Resp 16   Ht 1.626 m (5' 4\")   Wt 66.7 kg (147 lb)   SpO2 99%   BMI 25.23 kg/m²   Gen.: Well-developed, well-nourished, no apparent distress, pleasant and cooperative with the examination  HEENT: Normocephalic/atraumatic,  Neck: No JVD or bruits, no adenopathy  Cor: Regular rate and rhythm without murmur gallop or rub  Lungs: Clear to auscultation with equal breath sounds bilaterally. No wheezes, rhonchi.  Abdomen: Soft nontender without hepatosplenomegaly or masses appreciated, normoactive bowel sounds  Extremities: No cyanosis, clubbing or edema       ASSESSMENT/Plan:  1. Temporal pain  Chronic ongoing problem, now recurrent for the past 2 weeks she has been experiencing this recurrent headache on the left side with temporal pain, advised to start steroids again, differential diagnosis includes migraine headache, possibly temporal arteritis.  Advised to establish with headache clinic, will also send her for evaluation by ophthalmology.  methylPREDNISolone (MEDROL DOSEPAK) 4 MG Tablet Therapy Pack      2. Intractable headache, unspecified chronicity pattern, unspecified headache type  Chronic headaches, start Imitrex, possibly migraine headache follow-up with headache clinic.  Referral to Neurology    SUMAtriptan (IMITREX) 50 MG Tab    Referral to Ophthalmology      3. Flu vaccine need  INFLUENZA VACCINE QUAD INJ (PF)      4. Vision blurred  Referral to Ophthalmology         " Please note that this dictation was created using voice recognition software. I have worked with consultants from the vendor as well as technical experts from [facility] to optimize the interface. I have made every reasonable attempt to correct obvious errors, but I expect that there are errors of grammar and possibly content that I did not discover before finalizing the note.

## 2023-11-08 ENCOUNTER — APPOINTMENT (RX ONLY)
Dept: URBAN - METROPOLITAN AREA CLINIC 36 | Facility: CLINIC | Age: 63
Setting detail: DERMATOLOGY
End: 2023-11-08

## 2023-11-08 DIAGNOSIS — Z41.9 ENCOUNTER FOR PROCEDURE FOR PURPOSES OTHER THAN REMEDYING HEALTH STATE, UNSPECIFIED: ICD-10-CM

## 2023-11-08 PROCEDURE — ? HYDRAFACIAL

## 2023-11-08 ASSESSMENT — LOCATION SIMPLE DESCRIPTION DERM: LOCATION SIMPLE: INFERIOR FOREHEAD

## 2023-11-08 ASSESSMENT — LOCATION ZONE DERM: LOCATION ZONE: FACE

## 2023-11-08 ASSESSMENT — LOCATION DETAILED DESCRIPTION DERM: LOCATION DETAILED: INFERIOR MID FOREHEAD

## 2023-11-08 NOTE — PROCEDURE: HYDRAFACIAL
Number Of Passes Step 1: 2
Procedure: Fusion
Solution Override
Tip: Hydropeel Tip, Teal
Vacuum Pressure High Setting (Will Not Render If Set To 0): 16
Solution: Antiox-6
Price (Use Numbers Only, No Special Characters Or $): 942
Vacuum Pressure High Setting (Will Not Render If Set To 0): 22
Treatment Number: 3
Comments: Discussed MLD, sister practices Aryuvedic medicine.  Extra extractions on nose. Recommend skin pen for pore refinement
Number Of Passes Step 2: 1
Solution: Activ-4
Procedure: Peel
Tip: Hydropeel Tip, Clear
Indication: anti-aging
Procedure: Extraction
Procedure: Exfoliation
Solution: GlySal 15%
Solution: Beta-HD
Location: face
Tip: Hydropeel Tip, Blue
Procedure: Boost
Consent: Written consent obtained, risks reviewed including but not limited to crusting, scabbing, blistering, scarring, darker or lighter pigmentary change, bruising, and/or incomplete response.
Post-Care Instructions: I reviewed with the patient in detail post-care instructions. Patient should stay away from the sun and wear sun protection until treated areas are fully healed.

## 2023-11-15 ENCOUNTER — HOSPITAL ENCOUNTER (OUTPATIENT)
Dept: LAB | Facility: MEDICAL CENTER | Age: 63
End: 2023-11-15
Attending: FAMILY MEDICINE
Payer: COMMERCIAL

## 2023-11-15 DIAGNOSIS — E03.8 SUBCLINICAL HYPOTHYROIDISM: ICD-10-CM

## 2023-11-15 DIAGNOSIS — Z13.220 SCREENING FOR HYPERLIPIDEMIA: ICD-10-CM

## 2023-11-15 DIAGNOSIS — R51.9 INTRACTABLE HEADACHE, UNSPECIFIED CHRONICITY PATTERN, UNSPECIFIED HEADACHE TYPE: ICD-10-CM

## 2023-11-15 LAB
ALBUMIN SERPL BCP-MCNC: 4.3 G/DL (ref 3.2–4.9)
ALBUMIN/GLOB SERPL: 1.7 G/DL
ALP SERPL-CCNC: 73 U/L (ref 30–99)
ALT SERPL-CCNC: 12 U/L (ref 2–50)
ANION GAP SERPL CALC-SCNC: 10 MMOL/L (ref 7–16)
AST SERPL-CCNC: 23 U/L (ref 12–45)
BASOPHILS # BLD AUTO: 1 % (ref 0–1.8)
BASOPHILS # BLD: 0.05 K/UL (ref 0–0.12)
BILIRUB SERPL-MCNC: 0.4 MG/DL (ref 0.1–1.5)
BUN SERPL-MCNC: 14 MG/DL (ref 8–22)
CALCIUM ALBUM COR SERPL-MCNC: 8.9 MG/DL (ref 8.5–10.5)
CALCIUM SERPL-MCNC: 9.1 MG/DL (ref 8.4–10.2)
CHLORIDE SERPL-SCNC: 106 MMOL/L (ref 96–112)
CHOLEST SERPL-MCNC: 179 MG/DL (ref 100–199)
CO2 SERPL-SCNC: 25 MMOL/L (ref 20–33)
CREAT SERPL-MCNC: 0.91 MG/DL (ref 0.5–1.4)
EOSINOPHIL # BLD AUTO: 0.07 K/UL (ref 0–0.51)
EOSINOPHIL NFR BLD: 1.3 % (ref 0–6.9)
ERYTHROCYTE [DISTWIDTH] IN BLOOD BY AUTOMATED COUNT: 49.9 FL (ref 35.9–50)
FASTING STATUS PATIENT QL REPORTED: NORMAL
GFR SERPLBLD CREATININE-BSD FMLA CKD-EPI: 71 ML/MIN/1.73 M 2
GLOBULIN SER CALC-MCNC: 2.6 G/DL (ref 1.9–3.5)
GLUCOSE SERPL-MCNC: 100 MG/DL (ref 65–99)
HCT VFR BLD AUTO: 40.4 % (ref 37–47)
HDLC SERPL-MCNC: 72 MG/DL
HGB BLD-MCNC: 12.8 G/DL (ref 12–16)
IMM GRANULOCYTES # BLD AUTO: 0.02 K/UL (ref 0–0.11)
IMM GRANULOCYTES NFR BLD AUTO: 0.4 % (ref 0–0.9)
LDLC SERPL CALC-MCNC: 94 MG/DL
LYMPHOCYTES # BLD AUTO: 2.05 K/UL (ref 1–4.8)
LYMPHOCYTES NFR BLD: 39.3 % (ref 22–41)
MCH RBC QN AUTO: 29.3 PG (ref 27–33)
MCHC RBC AUTO-ENTMCNC: 31.7 G/DL (ref 32.2–35.5)
MCV RBC AUTO: 92.4 FL (ref 81.4–97.8)
MONOCYTES # BLD AUTO: 0.36 K/UL (ref 0–0.85)
MONOCYTES NFR BLD AUTO: 6.9 % (ref 0–13.4)
NEUTROPHILS # BLD AUTO: 2.67 K/UL (ref 1.82–7.42)
NEUTROPHILS NFR BLD: 51.1 % (ref 44–72)
NRBC # BLD AUTO: 0 K/UL
NRBC BLD-RTO: 0 /100 WBC (ref 0–0.2)
PLATELET # BLD AUTO: 183 K/UL (ref 164–446)
PMV BLD AUTO: 11 FL (ref 9–12.9)
POTASSIUM SERPL-SCNC: 4 MMOL/L (ref 3.6–5.5)
PROT SERPL-MCNC: 6.9 G/DL (ref 6–8.2)
RBC # BLD AUTO: 4.37 M/UL (ref 4.2–5.4)
SODIUM SERPL-SCNC: 141 MMOL/L (ref 135–145)
TRIGL SERPL-MCNC: 65 MG/DL (ref 0–149)
TSH SERPL DL<=0.005 MIU/L-ACNC: 2.37 UIU/ML (ref 0.38–5.33)
WBC # BLD AUTO: 5.2 K/UL (ref 4.8–10.8)

## 2023-11-15 PROCEDURE — 84443 ASSAY THYROID STIM HORMONE: CPT

## 2023-11-15 PROCEDURE — 85025 COMPLETE CBC W/AUTO DIFF WBC: CPT

## 2023-11-15 PROCEDURE — 80053 COMPREHEN METABOLIC PANEL: CPT

## 2023-11-15 PROCEDURE — 36415 COLL VENOUS BLD VENIPUNCTURE: CPT

## 2023-11-15 PROCEDURE — 80061 LIPID PANEL: CPT

## 2023-11-26 DIAGNOSIS — R51.9 INTRACTABLE HEADACHE, UNSPECIFIED CHRONICITY PATTERN, UNSPECIFIED HEADACHE TYPE: ICD-10-CM

## 2023-11-27 RX ORDER — SUMATRIPTAN 50 MG/1
50 TABLET, FILM COATED ORAL
Qty: 10 TABLET | Refills: 2 | Status: SHIPPED | OUTPATIENT
Start: 2023-11-27 | End: 2023-12-12

## 2023-11-27 NOTE — TELEPHONE ENCOUNTER
Received request via: Pharmacy    Was the patient seen in the last year in this department? Yes  LOV : 10/24/2023   Does the patient have an active prescription (recently filled or refills available) for medication(s) requested? Yes.     Does the patient have alf Plus and need 100 day supply (blood pressure, diabetes and cholesterol meds only)? Patient does not have SCP

## 2023-12-01 ENCOUNTER — APPOINTMENT (OUTPATIENT)
Dept: MEDICAL GROUP | Facility: LAB | Age: 63
End: 2023-12-01
Payer: COMMERCIAL

## 2023-12-01 ENCOUNTER — TELEMEDICINE (OUTPATIENT)
Dept: MEDICAL GROUP | Facility: LAB | Age: 63
End: 2023-12-01
Payer: COMMERCIAL

## 2023-12-01 VITALS — BODY MASS INDEX: 25.1 KG/M2 | HEIGHT: 64 IN | WEIGHT: 147 LBS

## 2023-12-01 DIAGNOSIS — R73.9 HYPERGLYCEMIA: ICD-10-CM

## 2023-12-01 DIAGNOSIS — E03.8 OTHER SPECIFIED HYPOTHYROIDISM: ICD-10-CM

## 2023-12-01 PROCEDURE — 99213 OFFICE O/P EST LOW 20 MIN: CPT | Mod: 95 | Performed by: FAMILY MEDICINE

## 2023-12-01 ASSESSMENT — FIBROSIS 4 INDEX: FIB4 SCORE: 2.29

## 2023-12-01 NOTE — PROGRESS NOTES
Virtual Visit: Established Patient   This visit was conducted via Zoom using secure and encrypted videoconferencing technology.   The patient was in their home in the state of [place].    The patient's identity was confirmed and verbal consent was obtained for this virtual visit.     Subjective:   CC:   Chief Complaint   Patient presents with    Lab Results       Elfego Ahuja is a 63 y.o. female presenting for evaluation and management of: Lab reviewed today,    1. Hyperglycemia  Patient noted that her fasting blood sugar was mildly elevated at 100, would like to rule out diabetes or prediabetes because of strong family history of diabetes.  Patient is adopting healthy lifestyle and healthy diet and she exercises regularly.    2. Other specified hypothyroidism  Chronic, normal thyroid function on current dose, no concerns      Discussed with the patient also mild decrease in her MCHC, explained as change in the average of the red blood cell mass, patient hemoglobin in the past used to be higher than that.  Patient said she frequently donates blood, discussed with the patient to reduce the frequency of blood donation every 3 months as possible and to supplement with greens and high iron diet before donation.  No concerns and asymptomatic.  Reassured      ROS       Current medicines (including changes today)  Current Outpatient Medications   Medication Sig Dispense Refill    amoxicillin-clavulanate (AUGMENTIN) 875-125 MG Tab Take 1 Tablet by mouth 2 times a day. 14 Tablet 0    SUMAtriptan (IMITREX) 50 MG Tab Take 1 tablet by mouth once as needed for migraine for up to 1 dose. 10 Tablet 2    methylPREDNISolone (MEDROL DOSEPAK) 4 MG Tablet Therapy Pack As directed on the packaging label. 21 Tablet 0    levothyroxine (SYNTHROID) 125 MCG Tab Take 0.5 Tablets by mouth every morning on an empty stomach. 90 Tablet 3    estradiol (VAGIFEM) 10 MCG Tab PLACE 1 INSERT VAGINALLY EVERY  THIRD DAY 24 Tablet 4    Estradiol 10 MCG  "INSERT Insert twice a week 24 Each 3    bacitracin-polymyxin b (POLYSPORIN) 500-01208 UNIT/GM Ointment Apply 0.5 Inches to affected eye(s) every 12 hours. 3.5 g 0    Nirmatrelvir&Ritonavir 300/100 20 x 150 MG & 10 x 100MG Tablet Therapy Pack Take 300 mg nirmatrelvir (two 150 mg tablets) with 100 mg ritonavir (one 100 mg tablet) by mouth, with all three tablets taken together twice daily for 5 days. 30 Each 0    valacyclovir (VALTREX) 1 GM Tab Take 1 Tablet by mouth 2 times a day. 20 Tablet 0    tretinoin (RETIN-A) 0.025 % cream       valACYclovir (VALTREX) 500 MG Tab Take 500 mg by mouth 2 times a day.      Cetirizine HCl (ZYRTEC ALLERGY PO) Take  by mouth.       No current facility-administered medications for this visit.       Patient Active Problem List    Diagnosis Date Noted    Encounter to establish care with new doctor 10/14/2022     Priority: High    Abnormal mammogram 05/20/2013     Priority: High    Other specified hypothyroidism 11/11/2022    Postmenopause 07/01/2021    Subclinical hypothyroidism 05/11/2021    Other hyperlipidemia 05/11/2021    Gout 10/01/2018    Vitamin D deficiency 08/12/2015    Acquired absence of uterus with remaining cervical stump 07/09/2013    Tinnitus 05/10/2013    Genital herpes simplex 04/18/2013        Objective:   Ht 1.626 m (5' 4\")   Wt 66.7 kg (147 lb)   BMI 25.23 kg/m²     Physical Exam:  Constitutional: Alert, no distress, well-groomed.  Skin: No rashes in visible areas.  Eye: Round. Conjunctiva clear, lids normal. No icterus.   ENMT: Lips pink without lesions, good dentition, moist mucous membranes. Phonation normal.  Neck: No masses, no thyromegaly. Moves freely without pain.  Respiratory: Unlabored respiratory effort, no cough or audible wheeze  Psych: Alert and oriented x3, normal affect and mood.     Assessment and Plan:   The following treatment plan was discussed:     1. Hyperglycemia  Reviewed labs, mild hyperglycemia, rule out diabetes.  Strong family history of " diabetes.  Continue with healthy lifestyle  - HEMOGLOBIN A1C; Future    2. Other specified hypothyroidism  Chronic well-controlled continue current dose    Follow-up: No follow-ups on file.

## 2023-12-06 ENCOUNTER — HOSPITAL ENCOUNTER (OUTPATIENT)
Dept: LAB | Facility: MEDICAL CENTER | Age: 63
End: 2023-12-06
Attending: FAMILY MEDICINE
Payer: COMMERCIAL

## 2023-12-06 ENCOUNTER — HOSPITAL ENCOUNTER (OUTPATIENT)
Dept: LAB | Facility: MEDICAL CENTER | Age: 63
End: 2023-12-06
Attending: OPTOMETRIST
Payer: COMMERCIAL

## 2023-12-06 DIAGNOSIS — R73.9 HYPERGLYCEMIA: ICD-10-CM

## 2023-12-06 LAB
CRP SERPL HS-MCNC: <0.3 MG/DL (ref 0–0.75)
ERYTHROCYTE [SEDIMENTATION RATE] IN BLOOD BY WESTERGREN METHOD: 8 MM/HOUR (ref 0–25)

## 2023-12-06 PROCEDURE — 86140 C-REACTIVE PROTEIN: CPT

## 2023-12-06 PROCEDURE — 85652 RBC SED RATE AUTOMATED: CPT

## 2023-12-06 PROCEDURE — 36415 COLL VENOUS BLD VENIPUNCTURE: CPT

## 2023-12-06 PROCEDURE — 83036 HEMOGLOBIN GLYCOSYLATED A1C: CPT

## 2023-12-07 LAB
EST. AVERAGE GLUCOSE BLD GHB EST-MCNC: 108 MG/DL
HBA1C MFR BLD: 5.4 % (ref 4–5.6)

## 2023-12-12 ENCOUNTER — OFFICE VISIT (OUTPATIENT)
Dept: NEUROLOGY | Facility: MEDICAL CENTER | Age: 63
End: 2023-12-12
Payer: COMMERCIAL

## 2023-12-12 VITALS
HEART RATE: 67 BPM | OXYGEN SATURATION: 100 % | BODY MASS INDEX: 25.1 KG/M2 | WEIGHT: 147.05 LBS | SYSTOLIC BLOOD PRESSURE: 118 MMHG | DIASTOLIC BLOOD PRESSURE: 64 MMHG | HEIGHT: 64 IN | TEMPERATURE: 97.1 F

## 2023-12-12 DIAGNOSIS — G43.009 MIGRAINE WITHOUT AURA AND WITHOUT STATUS MIGRAINOSUS, NOT INTRACTABLE: ICD-10-CM

## 2023-12-12 PROCEDURE — 99212 OFFICE O/P EST SF 10 MIN: CPT

## 2023-12-12 PROCEDURE — 3074F SYST BP LT 130 MM HG: CPT

## 2023-12-12 PROCEDURE — 99205 OFFICE O/P NEW HI 60 MIN: CPT

## 2023-12-12 PROCEDURE — 3078F DIAST BP <80 MM HG: CPT

## 2023-12-12 ASSESSMENT — FIBROSIS 4 INDEX: FIB4 SCORE: 2.29

## 2023-12-12 ASSESSMENT — PATIENT HEALTH QUESTIONNAIRE - PHQ9: CLINICAL INTERPRETATION OF PHQ2 SCORE: 0

## 2023-12-12 NOTE — PATIENT INSTRUCTIONS
Try supplementing:  - magnesium oxide: 400-600 mg once or 200-300 mg twice daily  - riboflavin (vitamin B2): 400 mg once daily  - coenzyme Q10: 300 mg daily
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
## 2024-01-17 ENCOUNTER — APPOINTMENT (RX ONLY)
Dept: URBAN - METROPOLITAN AREA CLINIC 36 | Facility: CLINIC | Age: 64
Setting detail: DERMATOLOGY
End: 2024-01-17

## 2024-01-17 DIAGNOSIS — Z41.9 ENCOUNTER FOR PROCEDURE FOR PURPOSES OTHER THAN REMEDYING HEALTH STATE, UNSPECIFIED: ICD-10-CM

## 2024-01-17 PROCEDURE — ? DERMAPLANE

## 2024-01-17 PROCEDURE — ? HYDRAFACIAL

## 2024-01-17 ASSESSMENT — LOCATION SIMPLE DESCRIPTION DERM: LOCATION SIMPLE: LEFT FOREHEAD

## 2024-01-17 ASSESSMENT — LOCATION ZONE DERM: LOCATION ZONE: FACE

## 2024-01-17 ASSESSMENT — LOCATION DETAILED DESCRIPTION DERM
LOCATION DETAILED: LEFT MEDIAL FOREHEAD
LOCATION DETAILED: LEFT INFERIOR MEDIAL FOREHEAD

## 2024-01-17 NOTE — PROCEDURE: HYDRAFACIAL
Number Of Passes Step 4: 1
Comments: Discussed series of skinpen & stem cells, possibly alternating VI peels to prep for sons wedding in October
Indication: anti-aging
Vacuum Pressure Low Setting (Will Not Render If Set To 0): 22
Vacuum Pressure Low Setting (Will Not Render If Set To 0): 16
Solution: Antiox-6
Procedure: Exfoliation
Solution: GlySal 30%
Solution Override
Tip: Hydropeel Tip, Clear
Location: face
Tip: Hydropeel Tip, Blue
Tip: Hydropeel Tip, Teal
Procedure: Extraction
Number Of Passes Step 1: 2
Solution: Activ-4
Consent: Written consent obtained, risks reviewed including but not limited to crusting, scabbing, blistering, scarring, darker or lighter pigmentary change, bruising, and/or incomplete response.
Solution: Beta-HD
Price (Use Numbers Only, No Special Characters Or $): 464
Post-Care Instructions: I reviewed with the patient in detail post-care instructions. Patient should stay away from the sun and wear sun protection until treated areas are fully healed.
Procedure: Peel
Procedure: Boost
Procedure: Fusion

## 2024-01-17 NOTE — PROCEDURE: DERMAPLANE
Treatment Area Prep Override: pca oil
Post-Care Instructions: I reviewed with the patient in detail post-care instructions.
Treatment Areas: face
Detail Level: Generalized
Pre-Procedure Text: The patient was placed in a recumbant position on the procedure table.
Post-Procedure Instructions: Following the dermaplane procedure, finishing products & SPF were applied
Blade: 10R blade scalpel
Price (Use Numbers Only, No Special Characters Or $): 80

## 2024-01-22 DIAGNOSIS — Z76.0 PRESCRIPTION REFILL: ICD-10-CM

## 2024-01-22 DIAGNOSIS — Z78.0 POSTMENOPAUSE: ICD-10-CM

## 2024-01-22 RX ORDER — ESTRADIOL 10 UG/1
INSERT VAGINAL
Qty: 24 TABLET | Refills: 4 | Status: SHIPPED | OUTPATIENT
Start: 2024-01-22 | End: 2024-01-22

## 2024-02-06 ENCOUNTER — APPOINTMENT (RX ONLY)
Dept: URBAN - METROPOLITAN AREA CLINIC 35 | Facility: CLINIC | Age: 64
Setting detail: DERMATOLOGY
End: 2024-02-06

## 2024-02-06 DIAGNOSIS — Z41.9 ENCOUNTER FOR PROCEDURE FOR PURPOSES OTHER THAN REMEDYING HEALTH STATE, UNSPECIFIED: ICD-10-CM

## 2024-02-06 PROCEDURE — ? FRAXEL

## 2024-02-06 ASSESSMENT — LOCATION ZONE DERM
LOCATION ZONE: FACE
LOCATION ZONE: LIP

## 2024-02-06 ASSESSMENT — LOCATION SIMPLE DESCRIPTION DERM
LOCATION SIMPLE: UPPER LIP
LOCATION SIMPLE: RIGHT FOREHEAD

## 2024-02-06 ASSESSMENT — LOCATION DETAILED DESCRIPTION DERM
LOCATION DETAILED: PHILTRUM
LOCATION DETAILED: RIGHT MEDIAL FOREHEAD

## 2024-02-06 NOTE — PROCEDURE: FRAXEL
Number Of Passes: 4
Location: full face except eyelids
Energy(Mj/Cm2): 40
Location: neck
Energy(Mj/Cm2): 1
Large Metal Eye Shield Text: The ocular mucosa was anesthetized with tetracaine. Once adequate anesthesia was optained, large metal eye shields were inserted and remained in place until the procedure was completed.
Consent: Written consent obtained, risks reviewed including but not limited to pain and incomplete improvement.
Energy(Mj/Cm2): 20
Treatment Level: 2
External Cooling Fan Speed: 5
Energy(Mj/Cm2): 50
Indication: photodamage
Price (Use Numbers Only, No Special Characters Or $): 420.00
Small Plastic Eye Shield Text: The ocular mucosa was anesthetized with tetracaine. Once adequate anesthesia was optained, small plastic eye shields were inserted and remained in place until the procedure was completed.
Add Post-Care Below To The Note: Yes
Treatment Level: 6
Total Coverage: 20%
Length Of Topical Anesthesia Application (Optional): 60 minutes
Was An Eye Shield Used?: No
Depth In Microns (Use Numbers Only, No Special Characters Or $): 202
Location: dorsal forearms
Post-Care Instructions: I reviewed with the patient in detail post-care instructions. Patient should avoid sun until area fully healed.
Topical Anesthesia Type: 23% lidocaine, 7% tetracaine
Wavelength: 1550nm
Medium Plastic Eye Shield Text: The ocular mucosa was anesthetized with tetracaine. Once adequate anesthesia was optained, medium plastic eye shields were inserted and remained in place until the procedure was completed.
Small Metal Eye Shield Text: The ocular mucosa was anesthetized with tetracaine. Once adequate anesthesia was optained, small metal eye shields were inserted and remained in place until the procedure was completed.
Total Coverage: 9%
Total Coverage: 11%
Large Plastic Eye Shield Text: The ocular mucosa was anesthetized with tetracaine. Once adequate anesthesia was optained, large plastic eye shields were inserted and remained in place until the procedure was completed.
Detail Level: Simple
Medium Metal Eye Shield Text: The ocular mucosa was anesthetized with tetracaine. Once adequate anesthesia was optained, medium metal eye shields were inserted and remained in place until the procedure was completed.
Location: perioral area
Total Coverage: 45%
External Cooling: [employer] 5
Price (Use Numbers Only, No Special Characters Or $): 630.00

## 2024-02-16 ENCOUNTER — HOSPITAL ENCOUNTER (OUTPATIENT)
Dept: LAB | Facility: MEDICAL CENTER | Age: 64
End: 2024-02-16
Attending: PHYSICIAN ASSISTANT
Payer: COMMERCIAL

## 2024-02-16 LAB
25(OH)D3 SERPL-MCNC: 23 NG/ML (ref 30–100)
DHEA-S SERPL-MCNC: 159 UG/DL (ref 18.9–205)
ESTRADIOL SERPL-MCNC: <5 PG/ML
FERRITIN SERPL-MCNC: 49.2 NG/ML (ref 10–291)
FSH SERPL-ACNC: 90.5 MIU/ML
IRON SATN MFR SERPL: 45 % (ref 15–55)
IRON SERPL-MCNC: 157 UG/DL (ref 40–170)
LH SERPL-ACNC: 59.6 IU/L
T3FREE SERPL-MCNC: 2.53 PG/ML (ref 2–4.4)
T4 FREE SERPL-MCNC: 1.21 NG/DL (ref 0.93–1.7)
TESTOST SERPL-MCNC: 18 NG/DL (ref 9–75)
THYROPEROXIDASE AB SERPL-ACNC: 13 IU/ML (ref 0–9)
TIBC SERPL-MCNC: 348 UG/DL (ref 250–450)
TSH SERPL DL<=0.005 MIU/L-ACNC: 2.57 UIU/ML (ref 0.38–5.33)
UIBC SERPL-MCNC: 191 UG/DL (ref 110–370)
VIT B12 SERPL-MCNC: 356 PG/ML (ref 211–911)

## 2024-02-16 PROCEDURE — 82306 VITAMIN D 25 HYDROXY: CPT

## 2024-02-16 PROCEDURE — 83002 ASSAY OF GONADOTROPIN (LH): CPT

## 2024-02-16 PROCEDURE — 36415 COLL VENOUS BLD VENIPUNCTURE: CPT

## 2024-02-16 PROCEDURE — 84439 ASSAY OF FREE THYROXINE: CPT

## 2024-02-16 PROCEDURE — 84481 FREE ASSAY (FT-3): CPT

## 2024-02-16 PROCEDURE — 82626 DEHYDROEPIANDROSTERONE: CPT

## 2024-02-16 PROCEDURE — 83550 IRON BINDING TEST: CPT

## 2024-02-16 PROCEDURE — 82627 DEHYDROEPIANDROSTERONE: CPT

## 2024-02-16 PROCEDURE — 84403 ASSAY OF TOTAL TESTOSTERONE: CPT

## 2024-02-16 PROCEDURE — 86376 MICROSOMAL ANTIBODY EACH: CPT

## 2024-02-16 PROCEDURE — 82607 VITAMIN B-12: CPT

## 2024-02-16 PROCEDURE — 83540 ASSAY OF IRON: CPT

## 2024-02-16 PROCEDURE — 82670 ASSAY OF TOTAL ESTRADIOL: CPT

## 2024-02-16 PROCEDURE — 82728 ASSAY OF FERRITIN: CPT

## 2024-02-16 PROCEDURE — 83001 ASSAY OF GONADOTROPIN (FSH): CPT

## 2024-02-16 PROCEDURE — 84445 ASSAY OF TSI GLOBULIN: CPT

## 2024-02-16 PROCEDURE — 84443 ASSAY THYROID STIM HORMONE: CPT

## 2024-02-16 PROCEDURE — 84270 ASSAY OF SEX HORMONE GLOBUL: CPT

## 2024-02-17 LAB — TSI SER-ACNC: <0.1 IU/L

## 2024-02-18 LAB — SHBG SERPL-SCNC: 48 NMOL/L (ref 17–125)

## 2024-02-20 LAB — DHEA SERPL-MCNC: 4.21 NG/ML (ref 0.63–4.7)

## 2024-05-21 ENCOUNTER — HOSPITAL ENCOUNTER (OUTPATIENT)
Dept: LAB | Facility: MEDICAL CENTER | Age: 64
End: 2024-05-21
Attending: PHYSICIAN ASSISTANT
Payer: COMMERCIAL

## 2024-05-21 LAB
25(OH)D3 SERPL-MCNC: 49 NG/ML (ref 30–100)
T3FREE SERPL-MCNC: 2.77 PG/ML (ref 2–4.4)
T4 FREE SERPL-MCNC: 1.85 NG/DL (ref 0.93–1.7)
TSH SERPL DL<=0.005 MIU/L-ACNC: 0.27 UIU/ML (ref 0.38–5.33)
VIT B12 SERPL-MCNC: 868 PG/ML (ref 211–911)

## 2024-06-05 ENCOUNTER — DOCUMENTATION (OUTPATIENT)
Dept: HEALTH INFORMATION MANAGEMENT | Facility: OTHER | Age: 64
End: 2024-06-05
Payer: COMMERCIAL

## 2024-07-11 ENCOUNTER — HOSPITAL ENCOUNTER (OUTPATIENT)
Dept: LAB | Facility: MEDICAL CENTER | Age: 64
End: 2024-07-11
Attending: PHYSICIAN ASSISTANT
Payer: COMMERCIAL

## 2024-07-11 LAB
BASOPHILS # BLD AUTO: 1.1 % (ref 0–1.8)
BASOPHILS # BLD: 0.06 K/UL (ref 0–0.12)
EOSINOPHIL # BLD AUTO: 0.06 K/UL (ref 0–0.51)
EOSINOPHIL NFR BLD: 1.1 % (ref 0–6.9)
ERYTHROCYTE [DISTWIDTH] IN BLOOD BY AUTOMATED COUNT: 47.6 FL (ref 35.9–50)
ESTRADIOL SERPL-MCNC: 39.6 PG/ML
FSH SERPL-ACNC: 45.4 MIU/ML
HCT VFR BLD AUTO: 42.3 % (ref 37–47)
HGB BLD-MCNC: 13.7 G/DL (ref 12–16)
IMM GRANULOCYTES # BLD AUTO: 0.01 K/UL (ref 0–0.11)
IMM GRANULOCYTES NFR BLD AUTO: 0.2 % (ref 0–0.9)
LH SERPL-ACNC: 24.2 IU/L
LYMPHOCYTES # BLD AUTO: 1.63 K/UL (ref 1–4.8)
LYMPHOCYTES NFR BLD: 31.2 % (ref 22–41)
MCH RBC QN AUTO: 30.6 PG (ref 27–33)
MCHC RBC AUTO-ENTMCNC: 32.4 G/DL (ref 32.2–35.5)
MCV RBC AUTO: 94.6 FL (ref 81.4–97.8)
MONOCYTES # BLD AUTO: 0.4 K/UL (ref 0–0.85)
MONOCYTES NFR BLD AUTO: 7.7 % (ref 0–13.4)
NEUTROPHILS # BLD AUTO: 3.06 K/UL (ref 1.82–7.42)
NEUTROPHILS NFR BLD: 58.7 % (ref 44–72)
NRBC # BLD AUTO: 0 K/UL
NRBC BLD-RTO: 0 /100 WBC (ref 0–0.2)
PLATELET # BLD AUTO: 173 K/UL (ref 164–446)
PMV BLD AUTO: 11.3 FL (ref 9–12.9)
RBC # BLD AUTO: 4.47 M/UL (ref 4.2–5.4)
WBC # BLD AUTO: 5.2 K/UL (ref 4.8–10.8)

## 2024-07-11 PROCEDURE — 84403 ASSAY OF TOTAL TESTOSTERONE: CPT

## 2024-07-11 PROCEDURE — 36415 COLL VENOUS BLD VENIPUNCTURE: CPT

## 2024-07-11 PROCEDURE — 82670 ASSAY OF TOTAL ESTRADIOL: CPT

## 2024-07-11 PROCEDURE — 84270 ASSAY OF SEX HORMONE GLOBUL: CPT

## 2024-07-11 PROCEDURE — 83002 ASSAY OF GONADOTROPIN (LH): CPT

## 2024-07-11 PROCEDURE — 85025 COMPLETE CBC W/AUTO DIFF WBC: CPT

## 2024-07-11 PROCEDURE — 83001 ASSAY OF GONADOTROPIN (FSH): CPT

## 2024-07-12 LAB — SHBG SERPL-SCNC: 49 NMOL/L (ref 17–125)

## 2024-07-14 LAB — TESTOST SERPL-MCNC: 126 NG/DL (ref 5–32)

## 2024-07-23 ENCOUNTER — TELEPHONE (OUTPATIENT)
Dept: HEALTH INFORMATION MANAGEMENT | Facility: OTHER | Age: 64
End: 2024-07-23
Payer: COMMERCIAL

## 2024-09-25 ENCOUNTER — APPOINTMENT (RX ONLY)
Dept: URBAN - METROPOLITAN AREA CLINIC 36 | Facility: CLINIC | Age: 64
Setting detail: DERMATOLOGY
End: 2024-09-25

## 2024-09-25 DIAGNOSIS — Z41.9 ENCOUNTER FOR PROCEDURE FOR PURPOSES OTHER THAN REMEDYING HEALTH STATE, UNSPECIFIED: ICD-10-CM

## 2024-09-25 PROCEDURE — ? DERMAPLANE

## 2024-09-25 PROCEDURE — ? HYDRAFACIAL

## 2024-09-25 ASSESSMENT — LOCATION SIMPLE DESCRIPTION DERM
LOCATION SIMPLE: UPPER LIP
LOCATION SIMPLE: CHIN
LOCATION SIMPLE: LEFT LIP
LOCATION SIMPLE: LEFT CHEEK
LOCATION SIMPLE: RIGHT ANTERIOR NECK
LOCATION SIMPLE: INFERIOR FOREHEAD
LOCATION SIMPLE: LEFT FOREHEAD
LOCATION SIMPLE: RIGHT CHEEK
LOCATION SIMPLE: RIGHT FOREHEAD
LOCATION SIMPLE: NOSE

## 2024-09-25 ASSESSMENT — LOCATION DETAILED DESCRIPTION DERM
LOCATION DETAILED: LEFT SUPERIOR VERMILION LIP
LOCATION DETAILED: RIGHT CHIN
LOCATION DETAILED: RIGHT INFERIOR LATERAL FOREHEAD
LOCATION DETAILED: NASAL ROOT
LOCATION DETAILED: LEFT CENTRAL MALAR CHEEK
LOCATION DETAILED: NASAL TIP
LOCATION DETAILED: RIGHT MEDIAL FOREHEAD
LOCATION DETAILED: RIGHT LATERAL FOREHEAD
LOCATION DETAILED: RIGHT SUPERIOR LATERAL NECK
LOCATION DETAILED: LEFT INFERIOR LATERAL FOREHEAD
LOCATION DETAILED: PHILTRUM
LOCATION DETAILED: INFERIOR MID FOREHEAD
LOCATION DETAILED: LEFT INFERIOR CENTRAL MALAR CHEEK
LOCATION DETAILED: RIGHT INFERIOR CENTRAL MALAR CHEEK

## 2024-09-25 ASSESSMENT — LOCATION ZONE DERM
LOCATION ZONE: NOSE
LOCATION ZONE: FACE
LOCATION ZONE: NECK
LOCATION ZONE: LIP

## 2024-09-25 NOTE — PROCEDURE: DERMAPLANE
Pre-Procedure Text: The patient was placed in a recumbant position on the procedure table.
Post-Procedure Instructions: Following the dermaplane procedure, finishing products & SPF were applied
Post-Care Instructions: I reviewed with the patient in detail post-care instructions.
Price (Use Numbers Only, No Special Characters Or $): 80
Treatment Areas: face
Treatment Area Prep Override: pca oil
Detail Level: Generalized
Blade: 10R blade scalpel

## 2024-09-25 NOTE — PROCEDURE: HYDRAFACIAL
Post-Care Instructions: I reviewed with the patient in detail post-care instructions. Patient should stay away from the sun and wear sun protection until treated areas are fully healed.
Solution: GlySal 30%
Location Override: face & neck
Vacuum Pressure High Setting (Will Not Render If Set To 0): 16
Solution: Beta-HD
Tip: Hydropeel Tip, Clear
Vacuum Pressure Low Setting (Will Not Render If Set To 0): 14
Price (Use Numbers Only, No Special Characters Or $): 467
Number Of Passes Step 3: 1
Procedure: Fusion
Solution: Antiox-6
Procedure: Boost
Comments: Tolerated well, son’s wedding in [place] next week.  Bumped up tx today.  Discussed emface, Exion & exosomes.
Vacuum Pressure Low Setting (Will Not Render If Set To 0): 13
Indication: anti-aging
Number Of Passes Step 1: 2
Procedure: Exfoliation
Tip: Hydropeel Tip, Purple Aggression
Vacuum Pressure High Setting (Will Not Render If Set To 0): 22
Tip: Hydropeel Tip, Teal
Location: other
Solution Override
Procedure: Peel
Solution: Activ-4
Procedure: Extraction
Consent: Written consent obtained, risks reviewed including but not limited to crusting, scabbing, blistering, scarring, darker or lighter pigmentary change, bruising, and/or incomplete response.

## 2024-09-30 ENCOUNTER — HOSPITAL ENCOUNTER (OUTPATIENT)
Dept: LAB | Facility: MEDICAL CENTER | Age: 64
End: 2024-09-30
Attending: INTERNAL MEDICINE
Payer: COMMERCIAL

## 2024-09-30 LAB
BASOPHILS # BLD AUTO: 0.9 % (ref 0–1.8)
BASOPHILS # BLD: 0.05 K/UL (ref 0–0.12)
EOSINOPHIL # BLD AUTO: 0.09 K/UL (ref 0–0.51)
EOSINOPHIL NFR BLD: 1.6 % (ref 0–6.9)
ERYTHROCYTE [DISTWIDTH] IN BLOOD BY AUTOMATED COUNT: 43.3 FL (ref 35.9–50)
ESTRADIOL SERPL-MCNC: 28.6 PG/ML
FSH SERPL-ACNC: 28.2 MIU/ML
HCT VFR BLD AUTO: 40.4 % (ref 37–47)
HGB BLD-MCNC: 13.3 G/DL (ref 12–16)
IMM GRANULOCYTES # BLD AUTO: 0.01 K/UL (ref 0–0.11)
IMM GRANULOCYTES NFR BLD AUTO: 0.2 % (ref 0–0.9)
LH SERPL-ACNC: 11.8 IU/L
LYMPHOCYTES # BLD AUTO: 2.14 K/UL (ref 1–4.8)
LYMPHOCYTES NFR BLD: 39.2 % (ref 22–41)
MCH RBC QN AUTO: 30.4 PG (ref 27–33)
MCHC RBC AUTO-ENTMCNC: 32.9 G/DL (ref 32.2–35.5)
MCV RBC AUTO: 92.4 FL (ref 81.4–97.8)
MONOCYTES # BLD AUTO: 0.42 K/UL (ref 0–0.85)
MONOCYTES NFR BLD AUTO: 7.7 % (ref 0–13.4)
NEUTROPHILS # BLD AUTO: 2.75 K/UL (ref 1.82–7.42)
NEUTROPHILS NFR BLD: 50.4 % (ref 44–72)
NRBC # BLD AUTO: 0 K/UL
NRBC BLD-RTO: 0 /100 WBC (ref 0–0.2)
PLATELET # BLD AUTO: 205 K/UL (ref 164–446)
PMV BLD AUTO: 10.9 FL (ref 9–12.9)
RBC # BLD AUTO: 4.37 M/UL (ref 4.2–5.4)
WBC # BLD AUTO: 5.5 K/UL (ref 4.8–10.8)

## 2024-09-30 PROCEDURE — 83001 ASSAY OF GONADOTROPIN (FSH): CPT

## 2024-09-30 PROCEDURE — 36415 COLL VENOUS BLD VENIPUNCTURE: CPT

## 2024-09-30 PROCEDURE — 85025 COMPLETE CBC W/AUTO DIFF WBC: CPT

## 2024-09-30 PROCEDURE — 83002 ASSAY OF GONADOTROPIN (LH): CPT

## 2024-09-30 PROCEDURE — 84403 ASSAY OF TOTAL TESTOSTERONE: CPT

## 2024-09-30 PROCEDURE — 82670 ASSAY OF TOTAL ESTRADIOL: CPT

## 2024-09-30 PROCEDURE — 84270 ASSAY OF SEX HORMONE GLOBUL: CPT

## 2024-10-04 LAB — TESTOST SERPL-MCNC: 90 NG/DL (ref 5–32)

## 2024-10-06 LAB — SHBG SERPL-SCNC: 46 NMOL/L (ref 17–125)

## 2024-12-09 ENCOUNTER — HOSPITAL ENCOUNTER (OUTPATIENT)
Dept: LAB | Facility: MEDICAL CENTER | Age: 64
End: 2024-12-09
Attending: INTERNAL MEDICINE
Payer: COMMERCIAL

## 2024-12-09 LAB
BASOPHILS # BLD AUTO: 0.9 % (ref 0–1.8)
BASOPHILS # BLD: 0.05 K/UL (ref 0–0.12)
EOSINOPHIL # BLD AUTO: 0.11 K/UL (ref 0–0.51)
EOSINOPHIL NFR BLD: 1.9 % (ref 0–6.9)
ERYTHROCYTE [DISTWIDTH] IN BLOOD BY AUTOMATED COUNT: 45.3 FL (ref 35.9–50)
ESTRADIOL SERPL-MCNC: 59.2 PG/ML
FSH SERPL-ACNC: 13.2 MIU/ML
HCT VFR BLD AUTO: 42.8 % (ref 37–47)
HGB BLD-MCNC: 13.8 G/DL (ref 12–16)
IMM GRANULOCYTES # BLD AUTO: 0.03 K/UL (ref 0–0.11)
IMM GRANULOCYTES NFR BLD AUTO: 0.5 % (ref 0–0.9)
LH SERPL-ACNC: 5.7 IU/L
LYMPHOCYTES # BLD AUTO: 2.09 K/UL (ref 1–4.8)
LYMPHOCYTES NFR BLD: 35.9 % (ref 22–41)
MCH RBC QN AUTO: 30.1 PG (ref 27–33)
MCHC RBC AUTO-ENTMCNC: 32.2 G/DL (ref 32.2–35.5)
MCV RBC AUTO: 93.4 FL (ref 81.4–97.8)
MONOCYTES # BLD AUTO: 0.49 K/UL (ref 0–0.85)
MONOCYTES NFR BLD AUTO: 8.4 % (ref 0–13.4)
NEUTROPHILS # BLD AUTO: 3.05 K/UL (ref 1.82–7.42)
NEUTROPHILS NFR BLD: 52.4 % (ref 44–72)
NRBC # BLD AUTO: 0 K/UL
NRBC BLD-RTO: 0 /100 WBC (ref 0–0.2)
PLATELET # BLD AUTO: 177 K/UL (ref 164–446)
PMV BLD AUTO: 11.2 FL (ref 9–12.9)
RBC # BLD AUTO: 4.58 M/UL (ref 4.2–5.4)
WBC # BLD AUTO: 5.8 K/UL (ref 4.8–10.8)

## 2024-12-09 PROCEDURE — 82670 ASSAY OF TOTAL ESTRADIOL: CPT

## 2024-12-09 PROCEDURE — 84270 ASSAY OF SEX HORMONE GLOBUL: CPT

## 2024-12-09 PROCEDURE — 83002 ASSAY OF GONADOTROPIN (LH): CPT

## 2024-12-09 PROCEDURE — 84403 ASSAY OF TOTAL TESTOSTERONE: CPT

## 2024-12-09 PROCEDURE — 85025 COMPLETE CBC W/AUTO DIFF WBC: CPT

## 2024-12-09 PROCEDURE — 36415 COLL VENOUS BLD VENIPUNCTURE: CPT

## 2024-12-09 PROCEDURE — 83001 ASSAY OF GONADOTROPIN (FSH): CPT

## 2024-12-11 LAB — SHBG SERPL-SCNC: 40 NMOL/L (ref 17–125)

## 2024-12-12 LAB — TESTOST SERPL-MCNC: 165 NG/DL (ref 5–32)

## 2024-12-19 ENCOUNTER — TELEPHONE (OUTPATIENT)
Dept: HEALTH INFORMATION MANAGEMENT | Facility: OTHER | Age: 64
End: 2024-12-19
Payer: COMMERCIAL

## 2025-01-08 ENCOUNTER — RESEARCH ENCOUNTER (OUTPATIENT)
Dept: LAB | Facility: MEDICAL CENTER | Age: 65
End: 2025-01-08
Payer: COMMERCIAL

## 2025-01-08 DIAGNOSIS — Z00.6 RESEARCH STUDY PATIENT: ICD-10-CM

## 2025-01-10 ENCOUNTER — HOSPITAL ENCOUNTER (OUTPATIENT)
Dept: LAB | Facility: MEDICAL CENTER | Age: 65
End: 2025-01-10
Attending: FAMILY MEDICINE

## 2025-01-10 DIAGNOSIS — Z00.6 RESEARCH STUDY PATIENT: ICD-10-CM

## 2025-01-29 LAB
APOB+LDLR+PCSK9 GENE MUT ANL BLD/T: NOT DETECTED
BRCA1+BRCA2 DEL+DUP + FULL MUT ANL BLD/T: NOT DETECTED
MLH1+MSH2+MSH6+PMS2 GN DEL+DUP+FUL M: NOT DETECTED

## 2025-03-19 ENCOUNTER — APPOINTMENT (OUTPATIENT)
Dept: URBAN - METROPOLITAN AREA CLINIC 35 | Facility: CLINIC | Age: 65
Setting detail: DERMATOLOGY
End: 2025-03-19

## 2025-03-19 DIAGNOSIS — D22 MELANOCYTIC NEVI: ICD-10-CM

## 2025-03-19 DIAGNOSIS — Z85.828 PERSONAL HISTORY OF OTHER MALIGNANT NEOPLASM OF SKIN: ICD-10-CM

## 2025-03-19 DIAGNOSIS — L82.0 INFLAMED SEBORRHEIC KERATOSIS: ICD-10-CM

## 2025-03-19 DIAGNOSIS — L81.4 OTHER MELANIN HYPERPIGMENTATION: ICD-10-CM

## 2025-03-19 DIAGNOSIS — L82.1 OTHER SEBORRHEIC KERATOSIS: ICD-10-CM

## 2025-03-19 DIAGNOSIS — Z71.89 OTHER SPECIFIED COUNSELING: ICD-10-CM

## 2025-03-19 DIAGNOSIS — L57.0 ACTINIC KERATOSIS: ICD-10-CM

## 2025-03-19 DIAGNOSIS — L57.8 OTHER SKIN CHANGES DUE TO CHRONIC EXPOSURE TO NONIONIZING RADIATION: ICD-10-CM

## 2025-03-19 PROBLEM — D22.5 MELANOCYTIC NEVI OF TRUNK: Status: ACTIVE | Noted: 2025-03-19

## 2025-03-19 PROCEDURE — ? COUNSELING

## 2025-03-19 PROCEDURE — 99213 OFFICE O/P EST LOW 20 MIN: CPT | Mod: 25

## 2025-03-19 PROCEDURE — ? LIQUID NITROGEN

## 2025-03-19 PROCEDURE — ? TREATMENT REGIMEN

## 2025-03-19 PROCEDURE — ? PRESCRIPTION

## 2025-03-19 PROCEDURE — 17110 DESTRUCTION B9 LES UP TO 14: CPT

## 2025-03-19 RX ORDER — TRETIONIN 0.5 MG/G
THIN LAYER CREAM TOPICAL QHS
Qty: 45 | Refills: 6 | Status: ERX

## 2025-03-19 RX ORDER — FLUOROURACIL 5 MG/G
THIN LAYER CREAM TOPICAL BID
Qty: 40 | Refills: 2 | Status: ERX | COMMUNITY
Start: 2025-03-19

## 2025-03-19 RX ADMIN — FLUOROURACIL THIN LAYER: 5 CREAM TOPICAL at 00:00

## 2025-03-19 ASSESSMENT — LOCATION SIMPLE DESCRIPTION DERM
LOCATION SIMPLE: ABDOMEN
LOCATION SIMPLE: RIGHT CHEEK
LOCATION SIMPLE: LEFT CHEEK
LOCATION SIMPLE: RIGHT UPPER BACK
LOCATION SIMPLE: RIGHT BREAST
LOCATION SIMPLE: LEFT FOREHEAD

## 2025-03-19 ASSESSMENT — LOCATION DETAILED DESCRIPTION DERM
LOCATION DETAILED: RIGHT RIB CAGE
LOCATION DETAILED: RIGHT LATERAL BREAST 9-10:00 REGION
LOCATION DETAILED: LEFT SUPERIOR FLANK
LOCATION DETAILED: LEFT CENTRAL MALAR CHEEK
LOCATION DETAILED: RIGHT SUPERIOR MEDIAL UPPER BACK
LOCATION DETAILED: LEFT INFERIOR FOREHEAD
LOCATION DETAILED: RIGHT MEDIAL UPPER BACK
LOCATION DETAILED: RIGHT LATERAL BREAST 10-11:00 REGION
LOCATION DETAILED: LEFT RIB CAGE
LOCATION DETAILED: EPIGASTRIC SKIN
LOCATION DETAILED: RIGHT CENTRAL MALAR CHEEK
LOCATION DETAILED: LEFT LATERAL MALAR CHEEK

## 2025-03-19 ASSESSMENT — LOCATION ZONE DERM
LOCATION ZONE: FACE
LOCATION ZONE: TRUNK

## 2025-03-19 NOTE — PROCEDURE: LIQUID NITROGEN
Render Note In Bullet Format When Appropriate: No
Number Of Freeze-Thaw Cycles: 2 freeze-thaw cycles
Show Aperture Variable?: Yes
Application Tool (Optional): Cry-AC
Consent: The patient's consent was obtained including but not limited to risks of crusting, scabbing, blistering, scarring, darker or lighter pigmentary change, recurrence, incomplete removal and infection.
Medical Necessity Information: It is in your best interest to select a reason for this procedure from the list below. All of these items fulfill various CMS LCD requirements except the new and changing color options.
Medical Necessity Clause: This procedure was medically necessary because the lesions that were treated were:
Spray Paint Text: The liquid nitrogen was applied to the skin utilizing a spray paint frosting technique.
Duration Of Freeze Thaw-Cycle (Seconds): 10
Post-Care Instructions: I reviewed with the patient in detail post-care instructions. Patient is to wear sunprotection, and avoid picking at any of the treated lesions. Pt may apply Vaseline to crusted or scabbing areas.
Detail Level: Detailed

## 2025-03-19 NOTE — PROCEDURE: TREATMENT REGIMEN
Continue Regimen: Tretinoin 0.05% cream qhs as tolerated
Detail Level: Zone
Initiate Treatment: 5FU on spots on face BID 2-4 weeks

## 2025-03-25 ENCOUNTER — APPOINTMENT (OUTPATIENT)
Dept: RADIOLOGY | Facility: MEDICAL CENTER | Age: 65
End: 2025-03-25
Attending: HEALTH EDUCATOR
Payer: COMMERCIAL

## 2025-03-25 ENCOUNTER — HOSPITAL ENCOUNTER (OUTPATIENT)
Facility: MEDICAL CENTER | Age: 65
End: 2025-03-25
Attending: HEALTH EDUCATOR
Payer: COMMERCIAL

## 2025-03-25 DIAGNOSIS — Z12.31 VISIT FOR SCREENING MAMMOGRAM: ICD-10-CM

## 2025-03-25 LAB
BASOPHILS # BLD AUTO: 0.6 % (ref 0–1.8)
BASOPHILS # BLD: 0.05 K/UL (ref 0–0.12)
EOSINOPHIL # BLD AUTO: 0.07 K/UL (ref 0–0.51)
EOSINOPHIL NFR BLD: 0.9 % (ref 0–6.9)
ERYTHROCYTE [DISTWIDTH] IN BLOOD BY AUTOMATED COUNT: 47 FL (ref 35.9–50)
ESTRADIOL SERPL-MCNC: 47.6 PG/ML
FSH SERPL-ACNC: 11.1 MIU/ML
HCT VFR BLD AUTO: 42.6 % (ref 37–47)
HGB BLD-MCNC: 13.6 G/DL (ref 12–16)
IMM GRANULOCYTES # BLD AUTO: 0.03 K/UL (ref 0–0.11)
IMM GRANULOCYTES NFR BLD AUTO: 0.4 % (ref 0–0.9)
LH SERPL-ACNC: 3.3 IU/L
LYMPHOCYTES # BLD AUTO: 2.08 K/UL (ref 1–4.8)
LYMPHOCYTES NFR BLD: 26.9 % (ref 22–41)
MCH RBC QN AUTO: 30 PG (ref 27–33)
MCHC RBC AUTO-ENTMCNC: 31.9 G/DL (ref 32.2–35.5)
MCV RBC AUTO: 94 FL (ref 81.4–97.8)
MONOCYTES # BLD AUTO: 0.53 K/UL (ref 0–0.85)
MONOCYTES NFR BLD AUTO: 6.9 % (ref 0–13.4)
NEUTROPHILS # BLD AUTO: 4.97 K/UL (ref 1.82–7.42)
NEUTROPHILS NFR BLD: 64.3 % (ref 44–72)
NRBC # BLD AUTO: 0 K/UL
NRBC BLD-RTO: 0 /100 WBC (ref 0–0.2)
PLATELET # BLD AUTO: 220 K/UL (ref 164–446)
PMV BLD AUTO: 11.4 FL (ref 9–12.9)
RBC # BLD AUTO: 4.53 M/UL (ref 4.2–5.4)
T3 SERPL-MCNC: 77.1 NG/DL (ref 60–181)
T3FREE SERPL-MCNC: 2.33 PG/ML (ref 2–4.4)
T4 FREE SERPL-MCNC: 1.45 NG/DL (ref 0.93–1.7)
TESTOST SERPL-MCNC: 167 NG/DL (ref 9–75)
THYROPEROXIDASE AB SERPL-ACNC: 10.3 IU/ML (ref 0–9)
TSH SERPL DL<=0.005 MIU/L-ACNC: 0.87 UIU/ML (ref 0.38–5.33)
WBC # BLD AUTO: 7.7 K/UL (ref 4.8–10.8)

## 2025-03-25 PROCEDURE — 36415 COLL VENOUS BLD VENIPUNCTURE: CPT

## 2025-03-25 PROCEDURE — 86376 MICROSOMAL ANTIBODY EACH: CPT

## 2025-03-25 PROCEDURE — 84480 ASSAY TRIIODOTHYRONINE (T3): CPT

## 2025-03-25 PROCEDURE — 84403 ASSAY OF TOTAL TESTOSTERONE: CPT

## 2025-03-25 PROCEDURE — 82670 ASSAY OF TOTAL ESTRADIOL: CPT

## 2025-03-25 PROCEDURE — 77067 SCR MAMMO BI INCL CAD: CPT

## 2025-03-25 PROCEDURE — 85025 COMPLETE CBC W/AUTO DIFF WBC: CPT

## 2025-03-25 PROCEDURE — 84443 ASSAY THYROID STIM HORMONE: CPT

## 2025-03-25 PROCEDURE — 84481 FREE ASSAY (FT-3): CPT

## 2025-03-25 PROCEDURE — 83001 ASSAY OF GONADOTROPIN (FSH): CPT

## 2025-03-25 PROCEDURE — 83002 ASSAY OF GONADOTROPIN (LH): CPT

## 2025-03-25 PROCEDURE — 84439 ASSAY OF FREE THYROXINE: CPT

## 2025-03-25 PROCEDURE — 84270 ASSAY OF SEX HORMONE GLOBUL: CPT

## 2025-03-27 LAB — SHBG SERPL-SCNC: 42 NMOL/L (ref 17–125)

## 2025-07-10 ENCOUNTER — HOSPITAL ENCOUNTER (OUTPATIENT)
Facility: MEDICAL CENTER | Age: 65
End: 2025-07-10
Attending: HEALTH EDUCATOR
Payer: COMMERCIAL

## 2025-07-10 LAB
BASOPHILS # BLD AUTO: 1 % (ref 0–1.8)
BASOPHILS # BLD: 0.06 K/UL (ref 0–0.12)
EOSINOPHIL # BLD AUTO: 0.03 K/UL (ref 0–0.51)
EOSINOPHIL NFR BLD: 0.5 % (ref 0–6.9)
ERYTHROCYTE [DISTWIDTH] IN BLOOD BY AUTOMATED COUNT: 45.1 FL (ref 35.9–50)
ESTRADIOL SERPL-MCNC: 68.1 PG/ML
FSH SERPL-ACNC: 14.7 MIU/ML
HCT VFR BLD AUTO: 39.6 % (ref 37–47)
HGB BLD-MCNC: 12.9 G/DL (ref 12–16)
IMM GRANULOCYTES # BLD AUTO: 0.01 K/UL (ref 0–0.11)
IMM GRANULOCYTES NFR BLD AUTO: 0.2 % (ref 0–0.9)
LH SERPL-ACNC: 5.8 IU/L
LYMPHOCYTES # BLD AUTO: 1.9 K/UL (ref 1–4.8)
LYMPHOCYTES NFR BLD: 32.8 % (ref 22–41)
MCH RBC QN AUTO: 30.1 PG (ref 27–33)
MCHC RBC AUTO-ENTMCNC: 32.6 G/DL (ref 32.2–35.5)
MCV RBC AUTO: 92.5 FL (ref 81.4–97.8)
MONOCYTES # BLD AUTO: 0.53 K/UL (ref 0–0.85)
MONOCYTES NFR BLD AUTO: 9.2 % (ref 0–13.4)
NEUTROPHILS # BLD AUTO: 3.26 K/UL (ref 1.82–7.42)
NEUTROPHILS NFR BLD: 56.3 % (ref 44–72)
NRBC # BLD AUTO: 0 K/UL
NRBC BLD-RTO: 0 /100 WBC (ref 0–0.2)
PLATELET # BLD AUTO: 204 K/UL (ref 164–446)
PMV BLD AUTO: 11.3 FL (ref 9–12.9)
RBC # BLD AUTO: 4.28 M/UL (ref 4.2–5.4)
T3FREE SERPL-MCNC: 3.48 PG/ML (ref 2–4.4)
T4 FREE SERPL-MCNC: 1.49 NG/DL (ref 0.93–1.7)
THYROPEROXIDASE AB SERPL-ACNC: 13.3 IU/ML (ref 0–9)
TSH SERPL DL<=0.005 MIU/L-ACNC: 0.12 UIU/ML (ref 0.38–5.33)
WBC # BLD AUTO: 5.8 K/UL (ref 4.8–10.8)

## 2025-07-10 PROCEDURE — 84481 FREE ASSAY (FT-3): CPT

## 2025-07-10 PROCEDURE — 84270 ASSAY OF SEX HORMONE GLOBUL: CPT

## 2025-07-10 PROCEDURE — 36415 COLL VENOUS BLD VENIPUNCTURE: CPT

## 2025-07-10 PROCEDURE — 84443 ASSAY THYROID STIM HORMONE: CPT

## 2025-07-10 PROCEDURE — 86376 MICROSOMAL ANTIBODY EACH: CPT

## 2025-07-10 PROCEDURE — 85025 COMPLETE CBC W/AUTO DIFF WBC: CPT

## 2025-07-10 PROCEDURE — 84439 ASSAY OF FREE THYROXINE: CPT

## 2025-07-10 PROCEDURE — 83002 ASSAY OF GONADOTROPIN (LH): CPT

## 2025-07-10 PROCEDURE — 83001 ASSAY OF GONADOTROPIN (FSH): CPT

## 2025-07-10 PROCEDURE — 82670 ASSAY OF TOTAL ESTRADIOL: CPT

## 2025-07-10 PROCEDURE — 84403 ASSAY OF TOTAL TESTOSTERONE: CPT

## 2025-07-12 LAB — SHBG SERPL-SCNC: 51 NMOL/L (ref 17–125)

## 2025-07-15 LAB — TESTOST SERPL-MCNC: 97 NG/DL (ref 5–32)
